# Patient Record
Sex: MALE | Race: WHITE | Employment: FULL TIME | URBAN - METROPOLITAN AREA
[De-identification: names, ages, dates, MRNs, and addresses within clinical notes are randomized per-mention and may not be internally consistent; named-entity substitution may affect disease eponyms.]

---

## 2017-01-10 ENCOUNTER — HOSPITAL ENCOUNTER (OUTPATIENT)
Dept: CV DIAGNOSTICS | Facility: HOSPITAL | Age: 47
Discharge: HOME | End: 2017-01-10
Attending: FAMILY MEDICINE
Payer: COMMERCIAL

## 2017-01-10 DIAGNOSIS — I25.110 ATHEROSCLEROSIS OF NATIVE CORONARY ARTERY OF NATIVE HEART WITH UNSTABLE ANGINA PECTORIS (HCC): ICD-10-CM

## 2017-01-10 PROCEDURE — 93350 STRESS TTE ONLY: CPT

## 2017-01-10 PROCEDURE — 93017 CV STRESS TEST TRACING ONLY: CPT

## 2017-01-10 PROCEDURE — 93225 XTRNL ECG REC<48 HRS REC: CPT

## 2017-01-10 PROCEDURE — 93018 CV STRESS TEST I&R ONLY: CPT | Performed by: INTERNAL MEDICINE

## 2017-01-10 PROCEDURE — 93226 XTRNL ECG REC<48 HR SCAN A/R: CPT

## 2017-01-10 PROCEDURE — 93227 XTRNL ECG REC<48 HR R&I: CPT | Performed by: FAMILY MEDICINE

## 2017-01-10 PROCEDURE — 93350 STRESS TTE ONLY: CPT | Performed by: INTERNAL MEDICINE

## 2021-10-14 ENCOUNTER — OFFICE VISIT (OUTPATIENT)
Dept: SURGERY | Facility: CLINIC | Age: 51
End: 2021-10-14
Payer: COMMERCIAL

## 2021-10-14 ENCOUNTER — TELEPHONE (OUTPATIENT)
Dept: SURGERY | Facility: CLINIC | Age: 51
End: 2021-10-14

## 2021-10-14 VITALS — HEART RATE: 88 BPM | SYSTOLIC BLOOD PRESSURE: 136 MMHG | DIASTOLIC BLOOD PRESSURE: 90 MMHG

## 2021-10-14 DIAGNOSIS — M54.12 CERVICAL RADICULOPATHY: ICD-10-CM

## 2021-10-14 DIAGNOSIS — M47.812 CERVICAL SPONDYLOSIS WITHOUT MYELOPATHY: Primary | ICD-10-CM

## 2021-10-14 PROCEDURE — 99203 OFFICE O/P NEW LOW 30 MIN: CPT | Performed by: NEUROLOGICAL SURGERY

## 2021-10-14 PROCEDURE — 3075F SYST BP GE 130 - 139MM HG: CPT | Performed by: NEUROLOGICAL SURGERY

## 2021-10-14 PROCEDURE — 3080F DIAST BP >= 90 MM HG: CPT | Performed by: NEUROLOGICAL SURGERY

## 2021-10-14 RX ORDER — HYDROCORTISONE 25 MG/G
CREAM TOPICAL
COMMUNITY
Start: 2021-10-06

## 2021-10-14 RX ORDER — IBUPROFEN 200 MG
800 TABLET ORAL 2 TIMES DAILY PRN
COMMUNITY

## 2021-10-14 RX ORDER — LISINOPRIL 30 MG/1
TABLET ORAL
COMMUNITY
Start: 2021-10-06

## 2021-10-14 RX ORDER — MONTELUKAST SODIUM 10 MG/1
TABLET ORAL
COMMUNITY
Start: 2021-10-06

## 2021-10-14 RX ORDER — CLOBETASOL PROPIONATE 0.5 MG/G
CREAM TOPICAL
COMMUNITY
Start: 2021-10-06

## 2021-10-14 RX ORDER — MELOXICAM 15 MG/1
15 TABLET ORAL DAILY
Qty: 30 TABLET | Refills: 2 | Status: SHIPPED | OUTPATIENT
Start: 2021-10-14 | End: 2021-12-02

## 2021-10-14 RX ORDER — POLYETHYLENE GLYCOL-3350 AND ELECTROLYTES WITH FLAVOR PACK 240; 5.84; 2.98; 6.72; 22.72 G/278.26G; G/278.26G; G/278.26G; G/278.26G; G/278.26G
POWDER, FOR SOLUTION ORAL
COMMUNITY
Start: 2021-10-12 | End: 2021-12-02

## 2021-10-14 NOTE — PROGRESS NOTES
Pain at this moment 0/10    Pain in thoracic to into arms worse on the left  Pain in the neck and headaches    New imaging from [facility] uploaded at the

## 2021-10-14 NOTE — H&P
Neurosurgery Clinic Visit  10/14/2021    Lawrence Hernandez PCP:  Sim Avila MD    1970 MRN [id number]       CHIEF COMPLAINT:  Patient presents with:  Neck Pain: NP      HISTORY OF PRESENT ILLNESS:  Lawrence Hernandez is a(n) 46year old (FIBER) 625 MG Oral Tab Take 3 tablets by mouth daily. • omeprazole 20 MG Oral Capsule Delayed Release Take 20 mg by mouth every morning. • Albuterol Sulfate (PROVENTIL HFA IN) Inhale 2 Inhalers into the lungs as needed.      • GAVILYTE-C 240 g Or problem appear intact and appropriate. Cranial nerve Exam:  Visual fields are full. The pupils are equal, round, and reactive to light. The extraocular movements are intact. Facial sensation in intact.   Facial symmetry and movement of the face is intac looks  He will follow-up with me in 6 weeks  We reviewed his films in detail  All questions answered  Patient very appreciative        Time spent on counseling/coordination of care:  30 Minutes    Total time spent with patient:  Jimy Espana

## 2021-11-02 ENCOUNTER — HOSPITAL ENCOUNTER (OUTPATIENT)
Dept: GENERAL RADIOLOGY | Age: 51
End: 2021-11-02
Attending: NEUROLOGICAL SURGERY
Payer: COMMERCIAL

## 2021-11-02 ENCOUNTER — HOSPITAL ENCOUNTER (OUTPATIENT)
Dept: GENERAL RADIOLOGY | Age: 51
Discharge: HOME | End: 2021-11-02
Attending: NEUROLOGICAL SURGERY
Payer: COMMERCIAL

## 2021-11-02 DIAGNOSIS — M54.12 CERVICAL RADICULOPATHY: ICD-10-CM

## 2021-11-02 DIAGNOSIS — M47.812 CERVICAL SPONDYLOSIS WITHOUT MYELOPATHY: ICD-10-CM

## 2021-11-02 PROCEDURE — 72052 X-RAY EXAM NECK SPINE 6/>VWS: CPT | Performed by: NEUROLOGICAL SURGERY

## 2021-12-02 ENCOUNTER — OFFICE VISIT (OUTPATIENT)
Dept: SURGERY | Facility: CLINIC | Age: 51
End: 2021-12-02
Payer: COMMERCIAL

## 2021-12-02 VITALS — HEART RATE: 80 BPM | SYSTOLIC BLOOD PRESSURE: 150 MMHG | DIASTOLIC BLOOD PRESSURE: 90 MMHG

## 2021-12-02 DIAGNOSIS — M47.812 CERVICAL SPONDYLOSIS WITHOUT MYELOPATHY: Primary | ICD-10-CM

## 2021-12-02 PROCEDURE — 3077F SYST BP >= 140 MM HG: CPT | Performed by: NEUROLOGICAL SURGERY

## 2021-12-02 PROCEDURE — 3080F DIAST BP >= 90 MM HG: CPT | Performed by: NEUROLOGICAL SURGERY

## 2021-12-02 PROCEDURE — 99212 OFFICE O/P EST SF 10 MIN: CPT | Performed by: NEUROLOGICAL SURGERY

## 2021-12-02 NOTE — PROGRESS NOTES
Neurosurgery Clinic Visit  2021    Maria Elena Brooke PCP:  Jo Romero MD    1970 MRN [id number]     HISTORY OF PRESENT ILLNESS:  Maria Elena Brooke is a(n) 46year old male presents for follow-up  He has been doing okay  He notes sometimes v

## 2022-02-12 ENCOUNTER — IMMUNIZATION (OUTPATIENT)
Dept: LAB | Age: 52
End: 2022-02-12
Attending: EMERGENCY MEDICINE
Payer: COMMERCIAL

## 2022-02-12 DIAGNOSIS — Z23 NEED FOR VACCINATION: Primary | ICD-10-CM

## 2022-02-12 PROCEDURE — 0054A SARSCOV2 VAC 30MCG TRS SUCR: CPT

## 2022-04-20 ENCOUNTER — APPOINTMENT (OUTPATIENT)
Dept: CT IMAGING | Facility: HOSPITAL | Age: 52
End: 2022-04-20
Attending: EMERGENCY MEDICINE
Payer: COMMERCIAL

## 2022-04-20 ENCOUNTER — APPOINTMENT (OUTPATIENT)
Dept: GENERAL RADIOLOGY | Facility: HOSPITAL | Age: 52
End: 2022-04-20
Attending: EMERGENCY MEDICINE
Payer: COMMERCIAL

## 2022-04-20 ENCOUNTER — HOSPITAL ENCOUNTER (EMERGENCY)
Facility: HOSPITAL | Age: 52
Discharge: HOME | End: 2022-04-20
Attending: EMERGENCY MEDICINE
Payer: COMMERCIAL

## 2022-04-20 VITALS
WEIGHT: 260 LBS | DIASTOLIC BLOOD PRESSURE: 111 MMHG | TEMPERATURE: 97 F | HEART RATE: 68 BPM | HEIGHT: 73 IN | RESPIRATION RATE: 12 BRPM | BODY MASS INDEX: 34.46 KG/M2 | SYSTOLIC BLOOD PRESSURE: 160 MMHG | OXYGEN SATURATION: 97 %

## 2022-04-20 DIAGNOSIS — I10 PRIMARY HYPERTENSION: Primary | ICD-10-CM

## 2022-04-20 DIAGNOSIS — R07.89 CHEST PAIN, ATYPICAL: ICD-10-CM

## 2022-04-20 DIAGNOSIS — G44.89 OTHER HEADACHE SYNDROME: ICD-10-CM

## 2022-04-20 LAB
ALBUMIN SERPL-MCNC: 3.7 G/DL (ref 3.4–5)
ALBUMIN/GLOB SERPL: 1.3 {RATIO} (ref 1–2)
ALP LIVER SERPL-CCNC: 57 U/L
ALT SERPL-CCNC: 80 U/L
ANION GAP SERPL CALC-SCNC: 3 MMOL/L (ref 0–18)
AST SERPL-CCNC: 51 U/L (ref 15–37)
ATRIAL RATE: 65 BPM
BASOPHILS # BLD AUTO: 0.05 X10(3) UL (ref 0–0.2)
BASOPHILS NFR BLD AUTO: 1 %
BILIRUB SERPL-MCNC: 1 MG/DL (ref 0.1–2)
BUN BLD-MCNC: 14 MG/DL (ref 7–18)
CALCIUM BLD-MCNC: 8.8 MG/DL (ref 8.5–10.1)
CHLORIDE SERPL-SCNC: 104 MMOL/L (ref 98–112)
CO2 SERPL-SCNC: 31 MMOL/L (ref 21–32)
CREAT BLD-MCNC: 1.13 MG/DL
EOSINOPHIL # BLD AUTO: 0.09 X10(3) UL (ref 0–0.7)
EOSINOPHIL NFR BLD AUTO: 1.8 %
ERYTHROCYTE [DISTWIDTH] IN BLOOD BY AUTOMATED COUNT: 11.9 %
GLOBULIN PLAS-MCNC: 2.9 G/DL (ref 2.8–4.4)
GLUCOSE BLD-MCNC: 95 MG/DL (ref 70–99)
HCT VFR BLD AUTO: 46.4 %
HGB BLD-MCNC: 16.9 G/DL
IMM GRANULOCYTES # BLD AUTO: 0.02 X10(3) UL (ref 0–1)
IMM GRANULOCYTES NFR BLD: 0.4 %
LYMPHOCYTES # BLD AUTO: 1.07 X10(3) UL (ref 1–4)
LYMPHOCYTES NFR BLD AUTO: 21.6 %
MCH RBC QN AUTO: 31 PG (ref 26–34)
MCHC RBC AUTO-ENTMCNC: 36.4 G/DL (ref 31–37)
MCV RBC AUTO: 85.1 FL
MONOCYTES # BLD AUTO: 0.5 X10(3) UL (ref 0.1–1)
MONOCYTES NFR BLD AUTO: 10.1 %
NEUTROPHILS # BLD AUTO: 3.23 X10 (3) UL (ref 1.5–7.7)
NEUTROPHILS # BLD AUTO: 3.23 X10(3) UL (ref 1.5–7.7)
NEUTROPHILS NFR BLD AUTO: 65.1 %
OSMOLALITY SERPL CALC.SUM OF ELEC: 286 MOSM/KG (ref 275–295)
P AXIS: 43 DEGREES
P-R INTERVAL: 178 MS
PLATELET # BLD AUTO: 172 10(3)UL (ref 150–450)
POTASSIUM SERPL-SCNC: 3.9 MMOL/L (ref 3.5–5.1)
PROT SERPL-MCNC: 6.6 G/DL (ref 6.4–8.2)
Q-T INTERVAL: 410 MS
QRS DURATION: 104 MS
QTC CALCULATION (BEZET): 426 MS
R AXIS: -29 DEGREES
RBC # BLD AUTO: 5.45 X10(6)UL
SARS-COV-2 RNA RESP QL NAA+PROBE: NOT DETECTED
SODIUM SERPL-SCNC: 138 MMOL/L (ref 136–145)
T AXIS: 8 DEGREES
TROPONIN I HIGH SENSITIVITY: 4 NG/L
TROPONIN I HIGH SENSITIVITY: 5 NG/L
VENTRICULAR RATE: 65 BPM
WBC # BLD AUTO: 5 X10(3) UL (ref 4–11)

## 2022-04-20 PROCEDURE — 84484 ASSAY OF TROPONIN QUANT: CPT

## 2022-04-20 PROCEDURE — 80053 COMPREHEN METABOLIC PANEL: CPT

## 2022-04-20 PROCEDURE — 84484 ASSAY OF TROPONIN QUANT: CPT | Performed by: EMERGENCY MEDICINE

## 2022-04-20 PROCEDURE — 70450 CT HEAD/BRAIN W/O DYE: CPT | Performed by: EMERGENCY MEDICINE

## 2022-04-20 PROCEDURE — 99285 EMERGENCY DEPT VISIT HI MDM: CPT

## 2022-04-20 PROCEDURE — 85025 COMPLETE CBC W/AUTO DIFF WBC: CPT | Performed by: EMERGENCY MEDICINE

## 2022-04-20 PROCEDURE — 71045 X-RAY EXAM CHEST 1 VIEW: CPT | Performed by: EMERGENCY MEDICINE

## 2022-04-20 PROCEDURE — 85025 COMPLETE CBC W/AUTO DIFF WBC: CPT

## 2022-04-20 PROCEDURE — 93005 ELECTROCARDIOGRAM TRACING: CPT

## 2022-04-20 PROCEDURE — 96374 THER/PROPH/DIAG INJ IV PUSH: CPT

## 2022-04-20 PROCEDURE — 80053 COMPREHEN METABOLIC PANEL: CPT | Performed by: EMERGENCY MEDICINE

## 2022-04-20 PROCEDURE — 93010 ELECTROCARDIOGRAM REPORT: CPT

## 2022-04-20 RX ORDER — ASPIRIN 81 MG/1
324 TABLET, CHEWABLE ORAL ONCE
Status: COMPLETED | OUTPATIENT
Start: 2022-04-20 | End: 2022-04-20

## 2022-04-20 RX ORDER — NITROGLYCERIN 0.4 MG/1
0.4 TABLET SUBLINGUAL EVERY 5 MIN PRN
Status: DISCONTINUED | OUTPATIENT
Start: 2022-04-20 | End: 2022-04-20

## 2022-04-20 RX ORDER — LISINOPRIL 40 MG/1
40 TABLET ORAL DAILY
Qty: 30 TABLET | Refills: 0 | Status: SHIPPED | OUTPATIENT
Start: 2022-04-20

## 2022-04-20 RX ORDER — HYDRALAZINE HYDROCHLORIDE 20 MG/ML
5 INJECTION INTRAMUSCULAR; INTRAVENOUS ONCE
Status: COMPLETED | OUTPATIENT
Start: 2022-04-20 | End: 2022-04-20

## 2022-04-20 RX ORDER — CLONIDINE HYDROCHLORIDE 0.1 MG/1
0.1 TABLET ORAL ONCE
Status: COMPLETED | OUTPATIENT
Start: 2022-04-20 | End: 2022-04-20

## 2022-04-20 RX ORDER — ACETAMINOPHEN 500 MG
1000 TABLET ORAL ONCE
Status: COMPLETED | OUTPATIENT
Start: 2022-04-20 | End: 2022-04-20

## 2022-04-20 NOTE — CM/SW NOTE
Outpatient stress echo ordered for tomorrow, 4/21/22 @ 8AM.  Instructions explained to patient and spouse. Both verbalized understanding.

## 2022-04-20 NOTE — ED INITIAL ASSESSMENT (HPI)
Pt ambulatory to er with ha all day - took am meds with no relief  Took thc for his chronic neck/med card avail - 2 hrs pta  Cp and left arm pain two hrs ago

## 2022-04-21 ENCOUNTER — HOSPITAL ENCOUNTER (OUTPATIENT)
Dept: CV DIAGNOSTICS | Facility: HOSPITAL | Age: 52
Discharge: HOME | End: 2022-04-21
Attending: EMERGENCY MEDICINE
Payer: COMMERCIAL

## 2022-04-21 DIAGNOSIS — R07.89 CHEST PAIN, ATYPICAL: ICD-10-CM

## 2022-04-21 PROCEDURE — 93017 CV STRESS TEST TRACING ONLY: CPT | Performed by: EMERGENCY MEDICINE

## 2022-04-21 PROCEDURE — 93350 STRESS TTE ONLY: CPT | Performed by: EMERGENCY MEDICINE

## 2022-04-21 PROCEDURE — 93018 CV STRESS TEST I&R ONLY: CPT | Performed by: EMERGENCY MEDICINE

## 2022-04-27 ENCOUNTER — OFFICE VISIT (OUTPATIENT)
Dept: FAMILY MEDICINE CLINIC | Facility: CLINIC | Age: 52
End: 2022-04-27
Payer: COMMERCIAL

## 2022-04-27 VITALS
RESPIRATION RATE: 16 BRPM | HEART RATE: 68 BPM | HEIGHT: 73 IN | TEMPERATURE: 97 F | DIASTOLIC BLOOD PRESSURE: 106 MMHG | SYSTOLIC BLOOD PRESSURE: 152 MMHG | BODY MASS INDEX: 35.25 KG/M2 | WEIGHT: 266 LBS | OXYGEN SATURATION: 98 %

## 2022-04-27 DIAGNOSIS — I10 UNCONTROLLED HYPERTENSION: Primary | ICD-10-CM

## 2022-04-27 PROCEDURE — 3080F DIAST BP >= 90 MM HG: CPT | Performed by: FAMILY MEDICINE

## 2022-04-27 PROCEDURE — 99203 OFFICE O/P NEW LOW 30 MIN: CPT | Performed by: FAMILY MEDICINE

## 2022-04-27 PROCEDURE — 3077F SYST BP >= 140 MM HG: CPT | Performed by: FAMILY MEDICINE

## 2022-04-27 PROCEDURE — 3008F BODY MASS INDEX DOCD: CPT | Performed by: FAMILY MEDICINE

## 2022-04-27 RX ORDER — METOPROLOL SUCCINATE 50 MG/1
TABLET, EXTENDED RELEASE ORAL
COMMUNITY
Start: 2022-04-04

## 2022-05-09 ENCOUNTER — OFFICE VISIT (OUTPATIENT)
Dept: FAMILY MEDICINE CLINIC | Facility: CLINIC | Age: 52
End: 2022-05-09
Payer: COMMERCIAL

## 2022-05-09 VITALS
DIASTOLIC BLOOD PRESSURE: 90 MMHG | TEMPERATURE: 98 F | WEIGHT: 267 LBS | HEIGHT: 73 IN | RESPIRATION RATE: 16 BRPM | OXYGEN SATURATION: 99 % | HEART RATE: 78 BPM | BODY MASS INDEX: 35.39 KG/M2 | SYSTOLIC BLOOD PRESSURE: 144 MMHG

## 2022-05-09 DIAGNOSIS — I10 UNCONTROLLED HYPERTENSION: Primary | ICD-10-CM

## 2022-05-09 DIAGNOSIS — R06.83 SNORING: ICD-10-CM

## 2022-05-09 DIAGNOSIS — Z12.5 SCREENING FOR PROSTATE CANCER: ICD-10-CM

## 2022-05-09 PROCEDURE — 90471 IMMUNIZATION ADMIN: CPT | Performed by: FAMILY MEDICINE

## 2022-05-09 PROCEDURE — 3077F SYST BP >= 140 MM HG: CPT | Performed by: FAMILY MEDICINE

## 2022-05-09 PROCEDURE — 3080F DIAST BP >= 90 MM HG: CPT | Performed by: FAMILY MEDICINE

## 2022-05-09 PROCEDURE — 90715 TDAP VACCINE 7 YRS/> IM: CPT | Performed by: FAMILY MEDICINE

## 2022-05-09 PROCEDURE — 99214 OFFICE O/P EST MOD 30 MIN: CPT | Performed by: FAMILY MEDICINE

## 2022-05-09 PROCEDURE — 3008F BODY MASS INDEX DOCD: CPT | Performed by: FAMILY MEDICINE

## 2022-05-09 RX ORDER — CETIRIZINE HYDROCHLORIDE 10 MG/1
10 TABLET ORAL DAILY
COMMUNITY

## 2022-05-09 RX ORDER — HYDROCHLOROTHIAZIDE 12.5 MG/1
12.5 TABLET ORAL DAILY
Qty: 30 TABLET | Refills: 1 | Status: SHIPPED | OUTPATIENT
Start: 2022-05-09

## 2022-05-09 RX ORDER — CHOLECALCIFEROL (VITAMIN D3) 1250 MCG
CAPSULE ORAL
COMMUNITY

## 2022-05-09 RX ORDER — MELATONIN
1000 DAILY
COMMUNITY

## 2022-06-06 ENCOUNTER — TELEPHONE (OUTPATIENT)
Dept: FAMILY MEDICINE CLINIC | Facility: CLINIC | Age: 52
End: 2022-06-06

## 2022-06-06 RX ORDER — LISINOPRIL 40 MG/1
40 TABLET ORAL DAILY
Qty: 30 TABLET | Refills: 0 | Status: SHIPPED | OUTPATIENT
Start: 2022-06-06

## 2022-06-06 NOTE — TELEPHONE ENCOUNTER
lisinopril 40mg ( prescribed by ED) will run out end of this week, scheduled follow up for  6/20      please send to [facility] [address]

## 2022-06-06 NOTE — TELEPHONE ENCOUNTER
Future Appointments   Date Time Provider [facility] A[facility]   6/14/2022 10:30 AM OSMANY [address]   6/20/2022  1:30 PM Kwame Still MD [facility]     LOV 5/9/2022

## 2022-06-14 ENCOUNTER — HOSPITAL ENCOUNTER (OUTPATIENT)
Dept: ULTRASOUND IMAGING | Age: 52
Discharge: HOME | End: 2022-06-14
Attending: FAMILY MEDICINE
Payer: COMMERCIAL

## 2022-06-14 DIAGNOSIS — I10 UNCONTROLLED HYPERTENSION: ICD-10-CM

## 2022-06-20 ENCOUNTER — OFFICE VISIT (OUTPATIENT)
Dept: FAMILY MEDICINE CLINIC | Facility: CLINIC | Age: 52
End: 2022-06-20
Payer: COMMERCIAL

## 2022-06-20 VITALS
DIASTOLIC BLOOD PRESSURE: 92 MMHG | TEMPERATURE: 97 F | HEART RATE: 76 BPM | RESPIRATION RATE: 18 BRPM | BODY MASS INDEX: 36.18 KG/M2 | SYSTOLIC BLOOD PRESSURE: 138 MMHG | WEIGHT: 273 LBS | HEIGHT: 73 IN | OXYGEN SATURATION: 98 %

## 2022-06-20 DIAGNOSIS — I10 UNCONTROLLED HYPERTENSION: Primary | ICD-10-CM

## 2022-06-20 PROCEDURE — 3075F SYST BP GE 130 - 139MM HG: CPT | Performed by: FAMILY MEDICINE

## 2022-06-20 PROCEDURE — 99214 OFFICE O/P EST MOD 30 MIN: CPT | Performed by: FAMILY MEDICINE

## 2022-06-20 PROCEDURE — 3008F BODY MASS INDEX DOCD: CPT | Performed by: FAMILY MEDICINE

## 2022-06-20 PROCEDURE — 3080F DIAST BP >= 90 MM HG: CPT | Performed by: FAMILY MEDICINE

## 2022-06-20 RX ORDER — METOPROLOL SUCCINATE 50 MG/1
50 TABLET, EXTENDED RELEASE ORAL DAILY
Qty: 90 TABLET | Refills: 2 | Status: SHIPPED | OUTPATIENT
Start: 2022-06-20

## 2022-06-20 RX ORDER — AMLODIPINE BESYLATE AND BENAZEPRIL HYDROCHLORIDE 5; 40 MG/1; MG/1
1 CAPSULE ORAL DAILY
Qty: 30 CAPSULE | Refills: 0 | Status: SHIPPED | OUTPATIENT
Start: 2022-06-20 | End: 2022-06-20

## 2022-06-20 RX ORDER — CHOLECALCIFEROL (VITAMIN D3) 1250 MCG
1 CAPSULE ORAL WEEKLY
Qty: 12 CAPSULE | Refills: 3 | Status: SHIPPED | OUTPATIENT
Start: 2022-06-20

## 2022-06-20 RX ORDER — HYDROCHLOROTHIAZIDE 12.5 MG/1
12.5 TABLET ORAL DAILY
Qty: 30 TABLET | Refills: 1 | Status: SHIPPED | OUTPATIENT
Start: 2022-06-20

## 2022-06-20 RX ORDER — CETIRIZINE HYDROCHLORIDE 10 MG/1
10 TABLET ORAL DAILY
Qty: 90 TABLET | Refills: 1 | Status: SHIPPED | OUTPATIENT
Start: 2022-06-20

## 2022-06-20 RX ORDER — MONTELUKAST SODIUM 10 MG/1
10 TABLET ORAL NIGHTLY
Qty: 90 TABLET | Refills: 2 | Status: SHIPPED | OUTPATIENT
Start: 2022-06-20

## 2022-06-20 RX ORDER — AMLODIPINE BESYLATE AND BENAZEPRIL HYDROCHLORIDE 5; 40 MG/1; MG/1
1 CAPSULE ORAL DAILY
Qty: 90 CAPSULE | Refills: 2 | Status: SHIPPED | OUTPATIENT
Start: 2022-06-20

## 2022-06-20 RX ORDER — MELATONIN
1000 DAILY
Qty: 90 TABLET | Refills: 2 | Status: SHIPPED | OUTPATIENT
Start: 2022-06-20

## 2022-06-20 RX ORDER — AMLODIPINE AND BENAZEPRIL HYDROCHLORIDE 5; 40 MG/1; MG/1
1 CAPSULE ORAL DAILY
Qty: 90 CAPSULE | Refills: 2 | OUTPATIENT
Start: 2022-06-20

## 2022-06-20 RX ORDER — OMEPRAZOLE 20 MG/1
20 CAPSULE, DELAYED RELEASE ORAL EVERY MORNING
Qty: 90 CAPSULE | Refills: 2 | Status: SHIPPED | OUTPATIENT
Start: 2022-06-20

## 2022-06-20 RX ORDER — AMLODIPINE BESYLATE AND BENAZEPRIL HYDROCHLORIDE 5; 40 MG/1; MG/1
1 CAPSULE ORAL DAILY
Qty: 90 CAPSULE | Refills: 2 | Status: SHIPPED | OUTPATIENT
Start: 2022-06-20 | End: 2022-06-20

## 2022-06-20 RX ORDER — LISINOPRIL 40 MG/1
40 TABLET ORAL DAILY
Qty: 90 TABLET | Refills: 2 | Status: SHIPPED | OUTPATIENT
Start: 2022-06-20 | End: 2022-06-20

## 2022-06-24 ENCOUNTER — TELEPHONE (OUTPATIENT)
Dept: FAMILY MEDICINE CLINIC | Facility: CLINIC | Age: 52
End: 2022-06-24

## 2022-06-28 NOTE — TELEPHONE ENCOUNTER
Dilip Bobo. They are asking for clarification on patient's antihypertensives. They are asking if patient is supposed to be taking both lisinopril and Amlodipine-Benazepril. Reviewed and read Dr. Sadiq Roy note stating to discontinue lisinopril and start amlodipine-Benazepril. They will update their records and dispense appropriate medications.

## 2022-07-14 ENCOUNTER — LAB ENCOUNTER (OUTPATIENT)
Dept: LAB | Age: 52
End: 2022-07-14
Attending: FAMILY MEDICINE
Payer: COMMERCIAL

## 2022-07-14 ENCOUNTER — HOSPITAL ENCOUNTER (OUTPATIENT)
Dept: ULTRASOUND IMAGING | Age: 52
Discharge: HOME | End: 2022-07-14
Attending: FAMILY MEDICINE
Payer: COMMERCIAL

## 2022-07-14 DIAGNOSIS — Z12.5 SCREENING FOR PROSTATE CANCER: ICD-10-CM

## 2022-07-14 DIAGNOSIS — Z00.00 ENCOUNTER FOR ANNUAL PHYSICAL EXAM: ICD-10-CM

## 2022-07-14 DIAGNOSIS — R73.01 IFG (IMPAIRED FASTING GLUCOSE): Primary | ICD-10-CM

## 2022-07-14 DIAGNOSIS — I10 UNCONTROLLED HYPERTENSION: ICD-10-CM

## 2022-07-14 DIAGNOSIS — R73.01 IFG (IMPAIRED FASTING GLUCOSE): ICD-10-CM

## 2022-07-14 LAB
ALBUMIN SERPL-MCNC: 3.7 G/DL (ref 3.4–5)
ALBUMIN/GLOB SERPL: 1.3 {RATIO} (ref 1–2)
ALP LIVER SERPL-CCNC: 53 U/L
ALT SERPL-CCNC: 98 U/L
ANION GAP SERPL CALC-SCNC: 5 MMOL/L (ref 0–18)
AST SERPL-CCNC: 63 U/L (ref 15–37)
BASOPHILS # BLD AUTO: 0.04 X10(3) UL (ref 0–0.2)
BASOPHILS NFR BLD AUTO: 0.9 %
BILIRUB SERPL-MCNC: 0.8 MG/DL (ref 0.1–2)
BUN BLD-MCNC: 15 MG/DL (ref 7–18)
CALCIUM BLD-MCNC: 8.9 MG/DL (ref 8.5–10.1)
CHLORIDE SERPL-SCNC: 106 MMOL/L (ref 98–112)
CHOLEST SERPL-MCNC: 145 MG/DL (ref ?–200)
CO2 SERPL-SCNC: 28 MMOL/L (ref 21–32)
COMPLEXED PSA SERPL-MCNC: 1.63 NG/ML (ref ?–4)
CREAT BLD-MCNC: 1.07 MG/DL
EOSINOPHIL # BLD AUTO: 0.11 X10(3) UL (ref 0–0.7)
EOSINOPHIL NFR BLD AUTO: 2.5 %
ERYTHROCYTE [DISTWIDTH] IN BLOOD BY AUTOMATED COUNT: 12.7 %
FASTING PATIENT LIPID ANSWER: YES
FASTING STATUS PATIENT QL REPORTED: YES
GLOBULIN PLAS-MCNC: 2.8 G/DL (ref 2.8–4.4)
GLUCOSE BLD-MCNC: 128 MG/DL (ref 70–99)
HCT VFR BLD AUTO: 46.5 %
HDLC SERPL-MCNC: 40 MG/DL (ref 40–59)
HGB BLD-MCNC: 16 G/DL
IMM GRANULOCYTES # BLD AUTO: 0.01 X10(3) UL (ref 0–1)
IMM GRANULOCYTES NFR BLD: 0.2 %
LDLC SERPL CALC-MCNC: 89 MG/DL (ref ?–100)
LYMPHOCYTES # BLD AUTO: 0.9 X10(3) UL (ref 1–4)
LYMPHOCYTES NFR BLD AUTO: 20.6 %
MCH RBC QN AUTO: 31.4 PG (ref 26–34)
MCHC RBC AUTO-ENTMCNC: 34.4 G/DL (ref 31–37)
MCV RBC AUTO: 91.4 FL
MONOCYTES # BLD AUTO: 0.54 X10(3) UL (ref 0.1–1)
MONOCYTES NFR BLD AUTO: 12.4 %
NEUTROPHILS # BLD AUTO: 2.77 X10 (3) UL (ref 1.5–7.7)
NEUTROPHILS # BLD AUTO: 2.77 X10(3) UL (ref 1.5–7.7)
NEUTROPHILS NFR BLD AUTO: 63.4 %
NONHDLC SERPL-MCNC: 105 MG/DL (ref ?–130)
OSMOLALITY SERPL CALC.SUM OF ELEC: 290 MOSM/KG (ref 275–295)
PLATELET # BLD AUTO: 169 10(3)UL (ref 150–450)
POTASSIUM SERPL-SCNC: 4.3 MMOL/L (ref 3.5–5.1)
PROT SERPL-MCNC: 6.5 G/DL (ref 6.4–8.2)
RBC # BLD AUTO: 5.09 X10(6)UL
SODIUM SERPL-SCNC: 139 MMOL/L (ref 136–145)
TRIGL SERPL-MCNC: 82 MG/DL (ref 30–149)
VLDLC SERPL CALC-MCNC: 13 MG/DL (ref 0–30)
WBC # BLD AUTO: 4.4 X10(3) UL (ref 4–11)

## 2022-07-14 PROCEDURE — 93975 VASCULAR STUDY: CPT | Performed by: FAMILY MEDICINE

## 2022-07-14 PROCEDURE — 85025 COMPLETE CBC W/AUTO DIFF WBC: CPT | Performed by: FAMILY MEDICINE

## 2022-07-14 PROCEDURE — 80053 COMPREHEN METABOLIC PANEL: CPT | Performed by: FAMILY MEDICINE

## 2022-07-14 PROCEDURE — 83036 HEMOGLOBIN GLYCOSYLATED A1C: CPT | Performed by: FAMILY MEDICINE

## 2022-07-14 PROCEDURE — 80061 LIPID PANEL: CPT | Performed by: FAMILY MEDICINE

## 2022-07-14 PROCEDURE — 84153 ASSAY OF PSA TOTAL: CPT | Performed by: FAMILY MEDICINE

## 2022-07-14 PROCEDURE — 76775 US EXAM ABDO BACK WALL LIM: CPT | Performed by: FAMILY MEDICINE

## 2022-07-15 LAB
EST. AVERAGE GLUCOSE BLD GHB EST-MCNC: 137 MG/DL (ref 68–126)
HBA1C MFR BLD: 6.4 % (ref ?–5.7)

## 2022-07-15 NOTE — PROGRESS NOTES
Please notify the patient of normal  Results except for elevated blood sugars and liver enzymes AST and ALT  Added on hemoglobin A1c to your blood panelRegarding the elevated liver enzymes, stop consumption of Tylenol and alcoholWe will recheck again in 3 months and if elevated, may need further investigation

## 2022-07-16 NOTE — PROGRESS NOTES
Hemoglobin A1c is 6.4, its worse than December 2021We will discuss your results at the time of your follow-up next week

## 2022-07-18 ENCOUNTER — OFFICE VISIT (OUTPATIENT)
Dept: FAMILY MEDICINE CLINIC | Facility: CLINIC | Age: 52
End: 2022-07-18
Payer: COMMERCIAL

## 2022-07-18 VITALS
RESPIRATION RATE: 18 BRPM | DIASTOLIC BLOOD PRESSURE: 78 MMHG | BODY MASS INDEX: 35.92 KG/M2 | WEIGHT: 271 LBS | SYSTOLIC BLOOD PRESSURE: 128 MMHG | TEMPERATURE: 97 F | OXYGEN SATURATION: 100 % | HEIGHT: 73 IN | HEART RATE: 73 BPM

## 2022-07-18 DIAGNOSIS — R63.5 ABNORMAL WEIGHT GAIN: ICD-10-CM

## 2022-07-18 DIAGNOSIS — I10 UNCONTROLLED HYPERTENSION: ICD-10-CM

## 2022-07-18 DIAGNOSIS — Z00.00 ENCOUNTER FOR ANNUAL PHYSICAL EXAM: Primary | ICD-10-CM

## 2022-07-18 DIAGNOSIS — R74.8 ELEVATED LIVER ENZYMES: ICD-10-CM

## 2022-07-18 DIAGNOSIS — R73.03 PREDIABETES: ICD-10-CM

## 2022-07-18 LAB
T4 FREE SERPL-MCNC: 1.1 NG/DL (ref 0.8–1.7)
TSI SER-ACNC: 1.53 MIU/ML (ref 0.36–3.74)

## 2022-07-18 PROCEDURE — 3008F BODY MASS INDEX DOCD: CPT | Performed by: FAMILY MEDICINE

## 2022-07-18 PROCEDURE — 99396 PREV VISIT EST AGE 40-64: CPT | Performed by: FAMILY MEDICINE

## 2022-07-18 PROCEDURE — 3074F SYST BP LT 130 MM HG: CPT | Performed by: FAMILY MEDICINE

## 2022-07-18 PROCEDURE — 99213 OFFICE O/P EST LOW 20 MIN: CPT | Performed by: FAMILY MEDICINE

## 2022-07-18 PROCEDURE — 3078F DIAST BP <80 MM HG: CPT | Performed by: FAMILY MEDICINE

## 2022-08-08 RX ORDER — HYDROCHLOROTHIAZIDE 12.5 MG/1
TABLET ORAL
Qty: 90 TABLET | Refills: 0 | Status: SHIPPED | OUTPATIENT
Start: 2022-08-08

## 2022-11-08 RX ORDER — HYDROCHLOROTHIAZIDE 12.5 MG/1
TABLET ORAL
Qty: 90 TABLET | Refills: 1 | Status: SHIPPED | OUTPATIENT
Start: 2022-11-08

## 2022-11-08 NOTE — TELEPHONE ENCOUNTER
Hypertension Medications Protocol Passed 11/08/2022 04:31 AM   Protocol Details  CMP or BMP in past 12 months    Last serum creatinine< 2.0    Appointment in past 6 or next 3 months        LOV 7/18/22     LAST LAB  7/14/22     LAST RX 8/8/22 90     Next OV  No future appointments.     PROTOCOL pass

## 2023-03-08 RX ORDER — AMLODIPINE BESYLATE AND BENAZEPRIL HYDROCHLORIDE 5; 40 MG/1; MG/1
1 CAPSULE ORAL DAILY
Qty: 90 CAPSULE | Refills: 1 | Status: SHIPPED
Start: 2023-03-08

## 2023-03-08 RX ORDER — MELATONIN
Qty: 90 TABLET | Refills: 1 | Status: SHIPPED | OUTPATIENT
Start: 2023-03-08

## 2023-03-08 RX ORDER — OMEPRAZOLE 20 MG/1
CAPSULE, DELAYED RELEASE ORAL
Qty: 90 CAPSULE | Refills: 1 | Status: SHIPPED
Start: 2023-03-08

## 2023-03-21 ENCOUNTER — LAB ENCOUNTER (OUTPATIENT)
Dept: LAB | Age: 53
End: 2023-03-21
Attending: FAMILY MEDICINE
Payer: COMMERCIAL

## 2023-03-21 DIAGNOSIS — R74.8 ELEVATED LIVER ENZYMES: ICD-10-CM

## 2023-03-21 DIAGNOSIS — R73.03 PREDIABETES: ICD-10-CM

## 2023-03-21 LAB
ALBUMIN SERPL-MCNC: 3.4 G/DL (ref 3.4–5)
ALBUMIN/GLOB SERPL: 1.2 {RATIO} (ref 1–2)
ALP LIVER SERPL-CCNC: 51 U/L
ALT SERPL-CCNC: 90 U/L
ANION GAP SERPL CALC-SCNC: 6 MMOL/L (ref 0–18)
AST SERPL-CCNC: 62 U/L (ref 15–37)
BILIRUB SERPL-MCNC: 0.9 MG/DL (ref 0.1–2)
BUN BLD-MCNC: 13 MG/DL (ref 7–18)
CALCIUM BLD-MCNC: 8.8 MG/DL (ref 8.5–10.1)
CHLORIDE SERPL-SCNC: 100 MMOL/L (ref 98–112)
CO2 SERPL-SCNC: 30 MMOL/L (ref 21–32)
CREAT BLD-MCNC: 1.14 MG/DL
EST. AVERAGE GLUCOSE BLD GHB EST-MCNC: 143 MG/DL (ref 68–126)
FASTING STATUS PATIENT QL REPORTED: YES
GFR SERPLBLD BASED ON 1.73 SQ M-ARVRAT: 77 ML/MIN/1.73M2 (ref 60–?)
GLOBULIN PLAS-MCNC: 2.8 G/DL (ref 2.8–4.4)
GLUCOSE BLD-MCNC: 149 MG/DL (ref 70–99)
HBA1C MFR BLD: 6.6 % (ref ?–5.7)
OSMOLALITY SERPL CALC.SUM OF ELEC: 285 MOSM/KG (ref 275–295)
POTASSIUM SERPL-SCNC: 4.1 MMOL/L (ref 3.5–5.1)
PROT SERPL-MCNC: 6.2 G/DL (ref 6.4–8.2)
SODIUM SERPL-SCNC: 136 MMOL/L (ref 136–145)

## 2023-03-21 PROCEDURE — 83036 HEMOGLOBIN GLYCOSYLATED A1C: CPT | Performed by: FAMILY MEDICINE

## 2023-03-21 PROCEDURE — 80053 COMPREHEN METABOLIC PANEL: CPT | Performed by: FAMILY MEDICINE

## 2023-03-23 ENCOUNTER — OFFICE VISIT (OUTPATIENT)
Dept: FAMILY MEDICINE CLINIC | Facility: CLINIC | Age: 53
End: 2023-03-23
Payer: COMMERCIAL

## 2023-03-23 VITALS
DIASTOLIC BLOOD PRESSURE: 60 MMHG | WEIGHT: 273 LBS | TEMPERATURE: 98 F | BODY MASS INDEX: 36.18 KG/M2 | HEIGHT: 73 IN | HEART RATE: 73 BPM | OXYGEN SATURATION: 97 % | RESPIRATION RATE: 18 BRPM | SYSTOLIC BLOOD PRESSURE: 126 MMHG

## 2023-03-23 DIAGNOSIS — R74.8 ELEVATED LIVER ENZYMES: Primary | ICD-10-CM

## 2023-03-23 DIAGNOSIS — R73.03 PREDIABETES: ICD-10-CM

## 2023-03-23 DIAGNOSIS — I10 ESSENTIAL HYPERTENSION: ICD-10-CM

## 2023-03-23 PROCEDURE — 99214 OFFICE O/P EST MOD 30 MIN: CPT | Performed by: FAMILY MEDICINE

## 2023-03-23 PROCEDURE — 3008F BODY MASS INDEX DOCD: CPT | Performed by: FAMILY MEDICINE

## 2023-03-23 PROCEDURE — 3074F SYST BP LT 130 MM HG: CPT | Performed by: FAMILY MEDICINE

## 2023-03-23 PROCEDURE — 3078F DIAST BP <80 MM HG: CPT | Performed by: FAMILY MEDICINE

## 2023-03-23 RX ORDER — MONTELUKAST SODIUM 10 MG/1
10 TABLET ORAL NIGHTLY
Qty: 90 TABLET | Refills: 2 | Status: SHIPPED | OUTPATIENT
Start: 2023-03-23

## 2023-03-23 RX ORDER — METOPROLOL SUCCINATE 50 MG/1
50 TABLET, EXTENDED RELEASE ORAL DAILY
Qty: 90 TABLET | Refills: 2 | Status: SHIPPED | OUTPATIENT
Start: 2023-03-23

## 2023-04-17 ENCOUNTER — OFFICE VISIT (OUTPATIENT)
Dept: SLEEP CENTER | Age: 53
End: 2023-04-17
Attending: INTERNAL MEDICINE
Payer: COMMERCIAL

## 2023-04-17 DIAGNOSIS — R06.83 SNORING: ICD-10-CM

## 2023-04-17 DIAGNOSIS — I10 UNCONTROLLED HYPERTENSION: ICD-10-CM

## 2023-04-17 PROCEDURE — 95806 SLEEP STUDY UNATT&RESP EFFT: CPT

## 2023-05-08 RX ORDER — HYDROCHLOROTHIAZIDE 12.5 MG/1
TABLET ORAL
Qty: 90 TABLET | Refills: 0 | Status: SHIPPED | OUTPATIENT
Start: 2023-05-08

## 2023-05-08 NOTE — TELEPHONE ENCOUNTER
LOV 3/23/2023      LAST LAB 3/21/23    LAST RX  HYDROCHLOROTHIAZIDE 12.5 MG Oral Tab 90 tablet 1 11/8/2022         Next OV No future appointments.       PROTOCOL pass

## 2023-07-06 NOTE — TELEPHONE ENCOUNTER
LOV 3/23/2023      LAST LAB 3/21/23    LAST RX  METFORMIN 500 MG Oral Tab 90 tablet 2 3/8/2023       Next OV No future appointments.       PROTOCOL failed

## 2023-08-07 RX ORDER — HYDROCHLOROTHIAZIDE 12.5 MG/1
TABLET ORAL
Qty: 90 TABLET | Refills: 0 | Status: SHIPPED | OUTPATIENT
Start: 2023-08-07

## 2023-08-07 NOTE — TELEPHONE ENCOUNTER
Hypertension Medications Protocol Osiezk5008/05/2023 04:47 AM   Protocol Details CMP or BMP in past 12 months    Last serum creatinine< 2.0    Appointment in past 6 or next 3 months          LOV 3/23/23     LAST LAB  3/21/23     LAST RX  5/8/23 90     Next OV No future appointments.       PROTOCOL  pass

## 2023-09-05 RX ORDER — AMLODIPINE AND BENAZEPRIL HYDROCHLORIDE 5; 40 MG/1; MG/1
1 CAPSULE ORAL DAILY
Qty: 90 CAPSULE | Refills: 0 | Status: SHIPPED | OUTPATIENT
Start: 2023-09-05

## 2023-09-05 RX ORDER — OMEPRAZOLE 20 MG/1
CAPSULE, DELAYED RELEASE ORAL
Qty: 90 CAPSULE | Refills: 0 | Status: SHIPPED | OUTPATIENT
Start: 2023-09-05

## 2023-09-05 RX ORDER — MELATONIN
1000 DAILY
Qty: 90 TABLET | Refills: 0 | Status: SHIPPED | OUTPATIENT
Start: 2023-09-05

## 2023-09-05 NOTE — TELEPHONE ENCOUNTER
LOV 3/23/2023      LAST LAB 3/21/23    LAST RX 3/8/23 90 tab 1 refill    Next OV No future appointments.       PROTOCOL pass

## 2023-10-03 NOTE — TELEPHONE ENCOUNTER
Appt scheduled for     Future Appointments   Date Time Provider Kristal Jeffers   10/11/2023 12:00 PM Mia Ash MD [facility]

## 2023-10-03 NOTE — TELEPHONE ENCOUNTER
LOV 3/23/2023      LAST LAB 3/21/23    LAST RX   metFORMIN 500 MG Oral Tab 90 tablet 1 7/6/2023    Sig:   Take 1 tablet (500 mg total) by mouth 2 (two) times daily with meals. Next OV No future appointments.       PROTOCOL failed

## 2023-10-18 ENCOUNTER — OFFICE VISIT (OUTPATIENT)
Dept: FAMILY MEDICINE CLINIC | Facility: CLINIC | Age: 53
End: 2023-10-18
Payer: COMMERCIAL

## 2023-10-18 VITALS
HEIGHT: 73 IN | HEART RATE: 87 BPM | TEMPERATURE: 97 F | DIASTOLIC BLOOD PRESSURE: 78 MMHG | BODY MASS INDEX: 34.72 KG/M2 | OXYGEN SATURATION: 98 % | WEIGHT: 262 LBS | RESPIRATION RATE: 18 BRPM | SYSTOLIC BLOOD PRESSURE: 128 MMHG

## 2023-10-18 DIAGNOSIS — Z23 NEED FOR VACCINATION: ICD-10-CM

## 2023-10-18 DIAGNOSIS — I10 ESSENTIAL HYPERTENSION: ICD-10-CM

## 2023-10-18 DIAGNOSIS — A63.0 CONDYLOMA ACUMINATUM: ICD-10-CM

## 2023-10-18 DIAGNOSIS — Z00.00 ENCOUNTER FOR ANNUAL PHYSICAL EXAM: Primary | ICD-10-CM

## 2023-10-18 DIAGNOSIS — R73.03 PREDIABETES: ICD-10-CM

## 2023-10-18 DIAGNOSIS — R74.8 ELEVATED LIVER ENZYMES: ICD-10-CM

## 2023-10-18 PROCEDURE — 3008F BODY MASS INDEX DOCD: CPT | Performed by: FAMILY MEDICINE

## 2023-10-18 PROCEDURE — 3078F DIAST BP <80 MM HG: CPT | Performed by: FAMILY MEDICINE

## 2023-10-18 PROCEDURE — 99396 PREV VISIT EST AGE 40-64: CPT | Performed by: FAMILY MEDICINE

## 2023-10-18 PROCEDURE — 3074F SYST BP LT 130 MM HG: CPT | Performed by: FAMILY MEDICINE

## 2023-10-18 PROCEDURE — 90750 HZV VACC RECOMBINANT IM: CPT | Performed by: FAMILY MEDICINE

## 2023-10-18 PROCEDURE — 90686 IIV4 VACC NO PRSV 0.5 ML IM: CPT | Performed by: FAMILY MEDICINE

## 2023-10-18 PROCEDURE — 99214 OFFICE O/P EST MOD 30 MIN: CPT | Performed by: FAMILY MEDICINE

## 2023-10-18 PROCEDURE — 90472 IMMUNIZATION ADMIN EACH ADD: CPT | Performed by: FAMILY MEDICINE

## 2023-10-18 PROCEDURE — 90471 IMMUNIZATION ADMIN: CPT | Performed by: FAMILY MEDICINE

## 2023-10-20 ENCOUNTER — TELEPHONE (OUTPATIENT)
Dept: FAMILY MEDICINE CLINIC | Facility: CLINIC | Age: 53
End: 2023-10-20

## 2023-10-20 NOTE — TELEPHONE ENCOUNTER
Pt said his Rx for Metformin went to [facility] but should have went to pill pack by [employer] on file

## 2023-11-03 RX ORDER — HYDROCHLOROTHIAZIDE 12.5 MG/1
TABLET ORAL
Qty: 90 TABLET | Refills: 1 | Status: SHIPPED | OUTPATIENT
Start: 2023-11-03

## 2023-11-03 NOTE — TELEPHONE ENCOUNTER
Hypertension Medications Protocol Svclqr3211/03/2023 05:04 AM   Protocol Details CMP or BMP in past 12 months    Last serum creatinine< 2.0    Appointment in past 6 or next 3 months        LOV 10/18/23 dr Jus Monk    LAST LAB  3/21/23    LAST RX 8/7/23 90     Next OV No future appointments.       PROTOCOL pass

## 2023-12-04 RX ORDER — MELATONIN
1000 DAILY
Qty: 90 TABLET | Refills: 0 | Status: SHIPPED | OUTPATIENT
Start: 2023-12-04

## 2023-12-04 RX ORDER — AMLODIPINE AND BENAZEPRIL HYDROCHLORIDE 5; 40 MG/1; MG/1
1 CAPSULE ORAL DAILY
Qty: 90 CAPSULE | Refills: 0 | Status: SHIPPED | OUTPATIENT
Start: 2023-12-04

## 2023-12-04 RX ORDER — OMEPRAZOLE 20 MG/1
CAPSULE, DELAYED RELEASE ORAL
Qty: 90 CAPSULE | Refills: 0 | Status: SHIPPED | OUTPATIENT
Start: 2023-12-04

## 2023-12-04 NOTE — TELEPHONE ENCOUNTER
LOV 10/18/2023      LAST LAB 3/21/23    LAST RX   Medication Quantity Refills Start End   AMLODIPINE BESY-BENAZEPRIL HCL 5-40 MG Oral Cap 90 capsule 0 9/5/2023        Next OV No future appointments.       PROTOCOL pass

## 2024-02-06 ENCOUNTER — IMMUNIZATION (OUTPATIENT)
Dept: LAB | Age: 54
End: 2024-02-06
Attending: EMERGENCY MEDICINE
Payer: COMMERCIAL

## 2024-02-06 DIAGNOSIS — Z23 NEED FOR VACCINATION: Primary | ICD-10-CM

## 2024-02-06 PROCEDURE — 90480 ADMN SARSCOV2 VAC 1/ONLY CMP: CPT

## 2024-02-27 ENCOUNTER — PATIENT MESSAGE (OUTPATIENT)
Dept: FAMILY MEDICINE CLINIC | Facility: CLINIC | Age: 54
End: 2024-02-27

## 2024-02-27 ENCOUNTER — LAB ENCOUNTER (OUTPATIENT)
Dept: LAB | Age: 54
End: 2024-02-27
Attending: FAMILY MEDICINE
Payer: COMMERCIAL

## 2024-02-27 ENCOUNTER — TELEPHONE (OUTPATIENT)
Dept: FAMILY MEDICINE CLINIC | Facility: CLINIC | Age: 54
End: 2024-02-27

## 2024-02-27 ENCOUNTER — OFFICE VISIT (OUTPATIENT)
Dept: FAMILY MEDICINE CLINIC | Facility: CLINIC | Age: 54
End: 2024-02-27
Payer: COMMERCIAL

## 2024-02-27 VITALS
HEART RATE: 74 BPM | WEIGHT: 267 LBS | OXYGEN SATURATION: 98 % | TEMPERATURE: 97 F | BODY MASS INDEX: 35.39 KG/M2 | HEIGHT: 73 IN | RESPIRATION RATE: 18 BRPM | DIASTOLIC BLOOD PRESSURE: 64 MMHG | SYSTOLIC BLOOD PRESSURE: 118 MMHG

## 2024-02-27 DIAGNOSIS — R10.11 RIGHT UPPER QUADRANT ABDOMINAL PAIN: ICD-10-CM

## 2024-02-27 DIAGNOSIS — R73.03 PREDIABETES: ICD-10-CM

## 2024-02-27 DIAGNOSIS — R10.11 RIGHT UPPER QUADRANT ABDOMINAL PAIN: Primary | ICD-10-CM

## 2024-02-27 DIAGNOSIS — R74.8 ELEVATED LIVER ENZYMES: ICD-10-CM

## 2024-02-27 DIAGNOSIS — Z00.00 ENCOUNTER FOR ANNUAL PHYSICAL EXAM: ICD-10-CM

## 2024-02-27 LAB
ALBUMIN SERPL-MCNC: 3.4 G/DL (ref 3.4–5)
ALBUMIN/GLOB SERPL: 1 {RATIO} (ref 1–2)
ALP LIVER SERPL-CCNC: 100 U/L
ALT SERPL-CCNC: 95 U/L
AMYLASE SERPL-CCNC: 68 U/L (ref 25–115)
ANION GAP SERPL CALC-SCNC: 8 MMOL/L (ref 0–18)
AST SERPL-CCNC: 88 U/L (ref 15–37)
BASOPHILS # BLD AUTO: 0.06 X10(3) UL (ref 0–0.2)
BASOPHILS NFR BLD AUTO: 1.2 %
BILIRUB SERPL-MCNC: 1.3 MG/DL (ref 0.1–2)
BUN BLD-MCNC: 10 MG/DL (ref 9–23)
CALCIUM BLD-MCNC: 9.5 MG/DL (ref 8.5–10.1)
CHLORIDE SERPL-SCNC: 100 MMOL/L (ref 98–112)
CHOLEST SERPL-MCNC: 167 MG/DL (ref ?–200)
CO2 SERPL-SCNC: 24 MMOL/L (ref 21–32)
CREAT BLD-MCNC: 1.08 MG/DL
EGFRCR SERPLBLD CKD-EPI 2021: 82 ML/MIN/1.73M2 (ref 60–?)
EOSINOPHIL # BLD AUTO: 0.22 X10(3) UL (ref 0–0.7)
EOSINOPHIL NFR BLD AUTO: 4.3 %
ERYTHROCYTE [DISTWIDTH] IN BLOOD BY AUTOMATED COUNT: 12.3 %
EST. AVERAGE GLUCOSE BLD GHB EST-MCNC: 169 MG/DL (ref 68–126)
FASTING PATIENT LIPID ANSWER: YES
FASTING STATUS PATIENT QL REPORTED: YES
GLOBULIN PLAS-MCNC: 3.4 G/DL (ref 2.8–4.4)
GLUCOSE BLD-MCNC: 147 MG/DL (ref 70–99)
HBA1C MFR BLD: 7.5 % (ref ?–5.7)
HCT VFR BLD AUTO: 47.4 %
HDLC SERPL-MCNC: 42 MG/DL (ref 40–59)
HGB BLD-MCNC: 16.9 G/DL
IMM GRANULOCYTES # BLD AUTO: 0.02 X10(3) UL (ref 0–1)
IMM GRANULOCYTES NFR BLD: 0.4 %
LDLC SERPL CALC-MCNC: 107 MG/DL (ref ?–100)
LIPASE SERPL-CCNC: 89 U/L (ref ?–300)
LYMPHOCYTES # BLD AUTO: 0.86 X10(3) UL (ref 1–4)
LYMPHOCYTES NFR BLD AUTO: 17 %
MCH RBC QN AUTO: 30.6 PG (ref 26–34)
MCHC RBC AUTO-ENTMCNC: 35.7 G/DL (ref 31–37)
MCV RBC AUTO: 85.7 FL
MONOCYTES # BLD AUTO: 0.66 X10(3) UL (ref 0.1–1)
MONOCYTES NFR BLD AUTO: 13 %
NEUTROPHILS # BLD AUTO: 3.24 X10 (3) UL (ref 1.5–7.7)
NEUTROPHILS # BLD AUTO: 3.24 X10(3) UL (ref 1.5–7.7)
NEUTROPHILS NFR BLD AUTO: 64.1 %
NONHDLC SERPL-MCNC: 125 MG/DL (ref ?–130)
OSMOLALITY SERPL CALC.SUM OF ELEC: 276 MOSM/KG (ref 275–295)
PLATELET # BLD AUTO: 222 10(3)UL (ref 150–450)
POTASSIUM SERPL-SCNC: 3.8 MMOL/L (ref 3.5–5.1)
PROT SERPL-MCNC: 6.8 G/DL (ref 6.4–8.2)
RBC # BLD AUTO: 5.53 X10(6)UL
SODIUM SERPL-SCNC: 132 MMOL/L (ref 136–145)
T4 FREE SERPL-MCNC: 1.2 NG/DL (ref 0.8–1.7)
TRIGL SERPL-MCNC: 98 MG/DL (ref 30–149)
TSI SER-ACNC: 2.2 MIU/ML (ref 0.36–3.74)
VLDLC SERPL CALC-MCNC: 17 MG/DL (ref 0–30)
WBC # BLD AUTO: 5.1 X10(3) UL (ref 4–11)

## 2024-02-27 PROCEDURE — 84439 ASSAY OF FREE THYROXINE: CPT

## 2024-02-27 PROCEDURE — 99214 OFFICE O/P EST MOD 30 MIN: CPT | Performed by: FAMILY MEDICINE

## 2024-02-27 PROCEDURE — 80061 LIPID PANEL: CPT

## 2024-02-27 PROCEDURE — 80053 COMPREHEN METABOLIC PANEL: CPT

## 2024-02-27 PROCEDURE — 36415 COLL VENOUS BLD VENIPUNCTURE: CPT

## 2024-02-27 PROCEDURE — 85025 COMPLETE CBC W/AUTO DIFF WBC: CPT

## 2024-02-27 PROCEDURE — 83690 ASSAY OF LIPASE: CPT

## 2024-02-27 PROCEDURE — 82150 ASSAY OF AMYLASE: CPT

## 2024-02-27 PROCEDURE — 84443 ASSAY THYROID STIM HORMONE: CPT

## 2024-02-27 PROCEDURE — 83013 H PYLORI (C-13) BREATH: CPT

## 2024-02-27 PROCEDURE — 83036 HEMOGLOBIN GLYCOSYLATED A1C: CPT

## 2024-02-27 NOTE — TELEPHONE ENCOUNTER
Pt calling stating that  told him to call if pt could not get in ASAP for US. The earliest he could get is next Tuesday. Calling to see if we can get him in sooner. Please advise    Future Appointments   Date Time Provider Department Center   3/5/2024  8:30 AM [facility]

## 2024-02-27 NOTE — TELEPHONE ENCOUNTER
[facility] called, spoke with Katie, stating PCP ordered CLOtest and this will not be able to be done. Will need new order for H.Pylori test- informed orders \"only to [facility]\".   Per lab to enter '[id number]'- order still shows as \"[facility]\".   When signed, order appears to Edward- per lab will be able to process.   Nothing further required. Lab verbalized understanding and agreed with POC.

## 2024-02-27 NOTE — PROGRESS NOTES
/64   Pulse 74   Temp 97.3 °F (36.3 °C) (Temporal)   Resp 18   Ht 6' 1\" (1.854 m)   Wt 267 lb (121.1 kg)   SpO2 98%   BMI 35.23 kg/m²               Chief Complaint   Patient presents with    Abdominal Pain        HPI;    Lokesh Joyner is a 53 year old male.  With history of type 2 diabetes mellitus hypertension hyperlipidemia fatty liver and obesity comes here today with history of right upper quadrant abdominal pain that has been going on on and off for the last 2 weeks, he did some diet modification with good relief, unfortunately the pain came back this weekend located right upper quadrant started after he had his breakfast, there was an omelette with cheese, he did not feel nauseous but had no appetite, felt bloated  Denies any history of diarrhea but states that his stools are not normal and sometimes they are green  Denies any blood in the stool, or black stools  No history of fever  Patient states that he has stopped taking omeprazole about month ago but restarted it 2 days ago    Patient had his blood test done that was ordered in October, the results are pending      Wt Readings from Last 3 Encounters:   02/27/24 267 lb (121.1 kg)   10/18/23 262 lb (118.8 kg)   03/23/23 273 lb (123.8 kg)     BP Readings from Last 3 Encounters:   02/27/24 118/64   10/18/23 128/78   03/23/23 126/60         ALLERGIES:  No Known Allergies  Current Outpatient Medications   Medication Sig Dispense Refill    AMLODIPINE BESY-BENAZEPRIL HCL 5-40 MG Oral Cap Take 1 capsule by mouth daily. 90 capsule 0    OMEPRAZOLE 20 MG Oral Capsule Delayed Release Take 1 capsule by mouth every morning. 90 capsule 0    cyanocobalamin 1000 MCG Oral Tab Take 1 tablet (1,000 mcg total) by mouth daily. 90 tablet 0    hydroCHLOROthiazide 12.5 MG Oral Tab Take 1 tablet by mouth once daily. 90 tablet 1    metFORMIN 500 MG Oral Tab Take 1 tablet (500 mg total) by mouth 2 (two) times daily with meals. 180 tablet 1    montelukast 10 MG Oral  Tab Take 1 tablet (10 mg total) by mouth nightly. 90 tablet 2    metoprolol succinate ER 50 MG Oral Tablet 24 Hr Take 1 tablet (50 mg total) by mouth daily. 90 tablet 2    Cholecalciferol (VITAMIN D3) 1.25 MG (85526 UT) Oral Cap Take 1 capsule by mouth once a week. 12 capsule 3    cetirizine 10 MG Oral Tab Take 1 tablet (10 mg total) by mouth daily. 90 tablet 1    clobetasol 0.05 % External Cream       ibuprofen 200 MG Oral Tab Take 4 tablets (800 mg total) by mouth 2 (two) times daily as needed for Pain.      Calcium Polycarbophil (FIBER) 625 MG Oral Tab Take 2 tablets (1,250 mg total) by mouth daily.      Albuterol Sulfate (PROVENTIL HFA IN) Inhale 2 Inhalers into the lungs as needed.        Past Medical History:   Diagnosis Date    Condyloma     Diabetes (HCC)     Esophageal reflux     Essential hypertension     Extrinsic asthma, unspecified     / in good control    Spinal stenosis       Social History:  Social History     Socioeconomic History    Marital status:    Tobacco Use    Smoking status: Former    Smokeless tobacco: Never    Tobacco comments:     smoked in college   Substance and Sexual Activity    Alcohol use: Yes     Comment: 2-3 x week    Drug use: Yes     Types: Cannabis     Comment: medical use-has card        REVIEW OF SYSTEMS:   GENERAL HEALTH: feels well otherwise  SKIN: denies any unusual skin lesions or rashes  RESPIRATORY: denies shortness of breath with exertion  CARDIOVASCULAR: denies chest pain on exertion  GI: As per HPI  NEURO: denies headaches  EXAM:   /64   Pulse 74   Temp 97.3 °F (36.3 °C) (Temporal)   Resp 18   Ht 6' 1\" (1.854 m)   Wt 267 lb (121.1 kg)   SpO2 98%   BMI 35.23 kg/m²   GENERAL: well developed, well nourished,in no apparent distress  SKIN: no rashes,no suspicious lesions  HEENT: atraumatic, normocephalic,ears and throat are clear  NECK: supple,no adenopathy,no bruits  LUNGS: clear to auscultation  CARDIO: RRR without murmur  GI: good BS's,no masses,  HSM or tenderness, no upper quadrant tenderness, no rebound, negative Zhao sign  Tympanic to percussion in the epigastric as well as left lower quadrant  EXTREMITIES: no cyanosis, clubbing or edema    ASSESSMENT AND PLAN:   Diagnoses and all orders for this visit:    Right upper quadrant abdominal pain  Lengthy discussion with the patient, we discussed the differential diagnosis of the right upper quadrant discomfort and pain including gallbladder disease pancreatitis, peptic ulcer and H. pylori gastritis-  My plan at this time is to check his amylase and lipase  For H. pylori breath test  Will also get ultrasound of the abdomen  In the meantime patient is advised to stay away from spicy and fatty food  If the abdominal pain gets worse he should to the emergency room  Ultrasound of the abdomen ordered to look at the gallbladder  We will call him with the results   lipase [E]; Future  -     Amylase; Future  -     CLOTest [E]; Future  -     US ABDOMEN COMPLETE (CPT=76700); Future        The patient indicates understanding of these issues and agrees to the plan.  Imaging & Consults:  US ABDOMEN COMPLETE (CPT=76700)  Meds & Refills for this Visit:  Requested Prescriptions      No prescriptions requested or ordered in this encounter     Orders Placed This Encounter   Procedures    Lipase [E]    Amylase    CLOTest [E]             Aaron Pompa MD   [facility]  [address] [place] [id number]    Electronically signed    This dictation was performed with a verbal recognition program (DRAGON) and it was checked for errors. It is possible that there are still dictated errors within this office note. If so, please bring any errors to my attention for an addendum. All efforts were made to ensure that this office note is accurate

## 2024-02-28 LAB — H PYLORI BREATH TEST: NEGATIVE

## 2024-02-28 NOTE — TELEPHONE ENCOUNTER
Returned call to patient and gave him numbers for [facility] and [facility] to see if they can schedule US sooner.  Advised to let us know if he can get a sooner appt and we will fax the order as appropriate.  Reviewed lab result note from Dr. Pompa.  He will discuss further at F/U appt after US is done.  Verbalizes understanding.    Aaron Pompa MD  2/28/2024 10:39 AM CST       Your electrolytes are normal except for a sodium of 132  Lipid  profile is normal  Unfortunately your A1c got worse at 7.5, we may need to go up on the metformin.  We will discuss further at the time of his follow-up after the ultrasound  Your amylase and lipase are normal

## 2024-02-28 NOTE — PROGRESS NOTES
Your electrolytes are normal except for a sodium of 132  Lipid  profile is normal  Unfortunately your A1c got worse at 7.5, we may need to go up on the metformin.  We will discuss further at the time of his follow-up after the ultrasound  Your amylase and lipase are normal

## 2024-03-04 ENCOUNTER — PATIENT MESSAGE (OUTPATIENT)
Dept: FAMILY MEDICINE CLINIC | Facility: CLINIC | Age: 54
End: 2024-03-04

## 2024-03-04 DIAGNOSIS — R10.11 RIGHT UPPER QUADRANT ABDOMINAL PAIN: Primary | ICD-10-CM

## 2024-03-04 NOTE — TELEPHONE ENCOUNTER
Pt called office to follow up on message sent. Pt notified we received message and Dr will get back as soon as he can. Pt stresses he is in extreme discomfort and would like a call ASAP. Pls advise

## 2024-03-04 NOTE — TELEPHONE ENCOUNTER
Spoke with patient, he reports Sat he was very bloated and in pain 10/10. He felt better after passing gas. He has not been eating much. Yesterday he had a small BM and passing lots of gas.     Today he is in pain rating 3-4/10, not much pressure. Please review US and advise on results.

## 2024-03-05 RX ORDER — METOPROLOL SUCCINATE 50 MG/1
50 TABLET, EXTENDED RELEASE ORAL DAILY
Qty: 90 TABLET | Refills: 0 | Status: SHIPPED | OUTPATIENT
Start: 2024-03-05

## 2024-03-05 RX ORDER — MONTELUKAST SODIUM 10 MG/1
10 TABLET ORAL NIGHTLY
Qty: 90 TABLET | Refills: 1 | Status: SHIPPED | OUTPATIENT
Start: 2024-03-05

## 2024-03-05 RX ORDER — OMEPRAZOLE 20 MG/1
CAPSULE, DELAYED RELEASE ORAL
Qty: 90 CAPSULE | Refills: 0 | Status: SHIPPED | OUTPATIENT
Start: 2024-03-05

## 2024-03-05 RX ORDER — MELATONIN
1000 DAILY
Qty: 90 TABLET | Refills: 0 | Status: SHIPPED | OUTPATIENT
Start: 2024-03-05

## 2024-03-05 RX ORDER — AMLODIPINE AND BENAZEPRIL HYDROCHLORIDE 5; 40 MG/1; MG/1
1 CAPSULE ORAL DAILY
Qty: 90 CAPSULE | Refills: 0 | Status: SHIPPED | OUTPATIENT
Start: 2024-03-05

## 2024-03-05 NOTE — TELEPHONE ENCOUNTER
Hypertension Medications Protocol Nkhzin5703/02/2024 05:27 AM   Protocol Details CMP or BMP in past 12 months    Last BP reading less than 140/90    In person appointment or virtual visit in the past 12 mos or appointment in next 3 mos    EGFRCR or GFRNAA > 50        LOV 2/27/24     LAST LAB  2/27/24    LAST RX 12/4/24 90     Next OV No future appointments.      PROTOCOL pass     Asthma & COPD Medication Protocol Yoyfff0503/02/2024 05:27 AM   Protocol Details Asthma Action Score greater than or equal to 20    AAP/ACT given in last 12 months    Appointment in past 6 or next 3 months        LOV 10/18/23     LAST LAB n/a     LAST RX  3/23/23    Next OV No future appointments.      PROTOCOL failed

## 2024-03-05 NOTE — TELEPHONE ENCOUNTER
Results of ultrasound discussed with the patient, fatty liver, right hepatic lobe cyst measuring about 3.0 cm and a mild splenomegaly.  But no cholecystitis cholelithiasis or any gallbladder issues  Unfortunately his symptoms of right-sided abdominal pain persists  Patient is advised treatment of his constipation with daily MiraLAX  If his symptoms do not resolve,referral was given to see Dr. Martin   Written by Aaron Pompa MD on 3/4/2024  7:30 PM CST  Seen by patient Lokesh Joyner on 3/4/2024 10:17 PM

## 2024-03-05 NOTE — PROGRESS NOTES
Results of ultrasound discussed with the patient, fatty liver, right hepatic lobe cyst measuring about 3.0 cm and a mild splenomegaly.  But no cholecystitis cholelithiasis or any gallbladder issues  Unfortunately his symptoms of right-sided abdominal pain persists  Patient is advised treatment of his constipation with daily MiraLAX  If his symptoms do not resolve,referral was given to see Dr. Martin

## 2024-03-18 ENCOUNTER — TELEPHONE (OUTPATIENT)
Dept: FAMILY MEDICINE CLINIC | Facility: CLINIC | Age: 54
End: 2024-03-18

## 2024-03-18 NOTE — TELEPHONE ENCOUNTER
Pt called LOV 2/27 stomach issues, started to feel like they were clearing up but symptoms returned completely Friday 3/15 and wants to know what his next step should be. Pls call

## 2024-03-18 NOTE — TELEPHONE ENCOUNTER
RN to pt call for condition update.  States he had initial improvement to GI symptoms doing MiraLax daily with occasional Milk of Magnesia doses, but feels as though his GI symptoms are getting bad again.  Reports a lot of bloating and gas-related pain, but doesn't pass a lot of actual flatus throughout the day.  Currently has loose/watery stool.  Pt waiting to see GI, appt in May.    Pt asking if MD can recommend anything while waiting for GI appt.  Please advise.

## 2024-03-19 RX ORDER — PANCRELIPASE LIPASE, PANCRELIPASE PROTEASE, PANCRELIPASE AMYLASE 40000; 126000; 168000 [USP'U]/1; [USP'U]/1; [USP'U]/1
1 CAPSULE, DELAYED RELEASE ORAL
Qty: 120 CAPSULE | Refills: 0 | Status: SHIPPED | OUTPATIENT
Start: 2024-03-19 | End: 2024-03-21

## 2024-03-19 NOTE — TELEPHONE ENCOUNTER
[phone]   Called pt to inform per PCP prescribed Zenpep to [facility] pharmacy on file. Pt will f/u if sx worsen, fail to improve, or any other concerns. No further questions. Pt verbalized understanding and agreed with POC.

## 2024-03-19 NOTE — TELEPHONE ENCOUNTER
Prescription of Zenpep sent to the pharmacy [facility]  Patient to take 1 capsule 10 minutes before meals  Is notify the patient

## 2024-03-21 ENCOUNTER — PATIENT MESSAGE (OUTPATIENT)
Dept: FAMILY MEDICINE CLINIC | Facility: CLINIC | Age: 54
End: 2024-03-21

## 2024-03-21 ENCOUNTER — OFFICE VISIT (OUTPATIENT)
Dept: FAMILY MEDICINE CLINIC | Facility: CLINIC | Age: 54
End: 2024-03-21
Payer: COMMERCIAL

## 2024-03-21 ENCOUNTER — LAB ENCOUNTER (OUTPATIENT)
Dept: LAB | Facility: HOSPITAL | Age: 54
End: 2024-03-21
Attending: FAMILY MEDICINE
Payer: COMMERCIAL

## 2024-03-21 VITALS
HEIGHT: 73 IN | OXYGEN SATURATION: 98 % | DIASTOLIC BLOOD PRESSURE: 74 MMHG | WEIGHT: 253 LBS | RESPIRATION RATE: 18 BRPM | HEART RATE: 80 BPM | TEMPERATURE: 97 F | SYSTOLIC BLOOD PRESSURE: 110 MMHG | BODY MASS INDEX: 33.53 KG/M2

## 2024-03-21 DIAGNOSIS — R19.7 DIARRHEA, UNSPECIFIED TYPE: Primary | ICD-10-CM

## 2024-03-21 PROCEDURE — 99214 OFFICE O/P EST MOD 30 MIN: CPT | Performed by: FAMILY MEDICINE

## 2024-03-21 NOTE — PROGRESS NOTES
/74   Pulse 80   Temp 97.2 °F (36.2 °C) (Temporal)   Resp 18   Ht 6' 1\" (1.854 m)   Wt 253 lb (114.8 kg)   SpO2 98%   BMI 33.38 kg/m²               Chief Complaint   Patient presents with    Weight Loss    Diarrhea        HPI;    Lokesh Joyner is a 53 year old male.  Who is here for follow-up on his persistent diarrhea recurrent abdominal bloating and weight loss.  Workup has been negative for H. pylori  Workup included CBC that was normal, CMP showed slightly low sodium and elevated liver enzymes which are chronic negative amylase and lipase  Modifying diet and treatment with PPI has not been effective  Patient has been having persistent diarrhea  The diarrhea is watery and 2-3 times a day  Patient has avoided dairy, high-fiber diet and treated with electrolytes and clear liquids for days without any help  Denies any fever nausea or vomiting  His symptoms have been waxing and waning, he has been feeling better but then gets explosive diarrhea  His abdominal bloating and discomfort has been moving from right upper quadrant to left upper quadrant to right lower quadrant at times    Today the patient brought in pictures of his stool that he observed leaf-like structures in the diarrhea patient states that has been weeks since she had salad          Wt Readings from Last 3 Encounters:   03/21/24 253 lb (114.8 kg)   02/27/24 267 lb (121.1 kg)   10/18/23 262 lb (118.8 kg)     BP Readings from Last 3 Encounters:   03/21/24 110/74   02/27/24 118/64   10/18/23 128/78         ALLERGIES:  No Known Allergies  Current Outpatient Medications   Medication Sig Dispense Refill    metoprolol succinate ER 50 MG Oral Tablet 24 Hr Take 1 tablet by mouth daily. 90 tablet 0    montelukast 10 MG Oral Tab Take 1 tablet (10 mg total) by mouth nightly. 90 tablet 1    AMLODIPINE BESY-BENAZEPRIL HCL 5-40 MG Oral Cap Take 1 capsule by mouth daily. 90 capsule 0    OMEPRAZOLE 20 MG Oral Capsule Delayed Release Take 1 capsule  by mouth every morning. 90 capsule 0    cyanocobalamin 1000 MCG Oral Tab Take 1 tablet (1,000 mcg total) by mouth daily. 90 tablet 0    hydroCHLOROthiazide 12.5 MG Oral Tab Take 1 tablet by mouth once daily. 90 tablet 1    metFORMIN 500 MG Oral Tab Take 1 tablet (500 mg total) by mouth 2 (two) times daily with meals. 180 tablet 1    cetirizine 10 MG Oral Tab Take 1 tablet (10 mg total) by mouth daily. 90 tablet 1    clobetasol 0.05 % External Cream       ibuprofen 200 MG Oral Tab Take 4 tablets (800 mg total) by mouth 2 (two) times daily as needed for Pain.      Calcium Polycarbophil (FIBER) 625 MG Oral Tab Take 2 tablets (1,250 mg total) by mouth daily.      Albuterol Sulfate (PROVENTIL HFA IN) Inhale 2 Inhalers into the lungs as needed.      nitazoxanide 500 MG Oral Tab Take 1 tablet (500 mg total) by mouth 2 (two) times daily with meals for 3 days. 6 tablet 0      Past Medical History:   Diagnosis Date    Condyloma     Diabetes (HCC)     Esophageal reflux     Essential hypertension     Extrinsic asthma, unspecified     / in good control    Spinal stenosis       Social History:  Social History     Socioeconomic History    Marital status:    Tobacco Use    Smoking status: Former    Smokeless tobacco: Never    Tobacco comments:     smoked in college   Substance and Sexual Activity    Alcohol use: Yes     Comment: 2-3 x week    Drug use: Yes     Types: Cannabis     Comment: medical use-has card        REVIEW OF SYSTEMS:   GENERAL HEALTH: feels well otherwise  SKIN: denies any unusual skin lesions or rashes  RESPIRATORY: denies shortness of breath with exertion  CARDIOVASCULAR: denies chest pain on exertion  GI: denies abdominal pain and denies heartburn  NEURO: denies headaches  EXAM:   /74   Pulse 80   Temp 97.2 °F (36.2 °C) (Temporal)   Resp 18   Ht 6' 1\" (1.854 m)   Wt 253 lb (114.8 kg)   SpO2 98%   BMI 33.38 kg/m²   GENERAL: well developed, well nourished,in no apparent distress  SKIN: no  rashes,no suspicious lesions  HEENT: atraumatic, normocephalic,  NECK: supple,no adenopathy,no bruits  LUNGS: clear to auscultation  CARDIO: RRR without murmur  GI: good BS's,no masses, HSM or tenderness      ASSESSMENT AND PLAN:   Diagnoses and all orders for this visit:    Diarrhea, unspecified type  We had a long discussion regarding the differential diagnosis that may include irritable bowel syndrome inflammatory bowel disease or infectious diarrhea my plan at this time is to get stool for ova and parasite  If negative we will get a CT of his abdomen and pelvis  Also planning to send him to gastroenterology if the diarrhea does not resolve  Stool sent for ova and parasite    Giardia + Crypto Antigen, Stool; Future  -     OVA AND PARASITES [681]; Future    Time spent at appointment today is 35 minutes including preparing to see patient, reviewing test results, performing medically appropriate examination and evaluation and coordinating care, counseling and educating patient/family, ordering medications and testing, and documenting clinical information in EMR.      The patient indicates understanding of these issues and agrees to the plan.  Imaging & Consults:  None  Meds & Refills for this Visit:  Requested Prescriptions      No prescriptions requested or ordered in this encounter     Orders Placed This Encounter   Procedures    Giardia + Crypto Antigen, Stool    OVA AND PARASITES [[id number]]             Aaron Pompa MD   [facility]  [address] [place] [id number]    Electronically signed    This dictation was performed with a verbal recognition program (DRAGON) and it was checked for errors. It is possible that there are still dictated errors within this office note. If so, please bring any errors to my attention for an addendum. All efforts were made to ensure that this office note is accurate

## 2024-03-22 ENCOUNTER — LAB ENCOUNTER (OUTPATIENT)
Dept: LAB | Age: 54
End: 2024-03-22
Attending: FAMILY MEDICINE
Payer: COMMERCIAL

## 2024-03-22 DIAGNOSIS — R19.7 DIARRHEA, UNSPECIFIED TYPE: ICD-10-CM

## 2024-03-22 LAB
CRYPTOSP AG STL QL IA: POSITIVE
G LAMBLIA AG STL QL IA: NEGATIVE

## 2024-03-22 PROCEDURE — 87272 CRYPTOSPORIDIUM AG IF: CPT

## 2024-03-22 PROCEDURE — 87177 OVA AND PARASITES SMEARS: CPT

## 2024-03-22 PROCEDURE — 87329 GIARDIA AG IA: CPT

## 2024-03-22 PROCEDURE — 87209 SMEAR COMPLEX STAIN: CPT

## 2024-03-22 RX ORDER — NITAZOXANIDE 500 MG/1
500 TABLET ORAL 2 TIMES DAILY WITH MEALS
Qty: 6 TABLET | Refills: 0 | Status: SHIPPED | OUTPATIENT
Start: 2024-03-22 | End: 2024-03-25

## 2024-03-23 NOTE — PROGRESS NOTES
Your stool is positive for Cryptosporidium      Cryptosporidium is an intracellular protozoan parasite that is associated with self-limited diarrhea in immunocompetent hosts   Immunocompetent patients usually have a spontaneous recovery within a few days to weeks and parasitologic cure within a few weeks to months without requiring any specific therapy.  For immunocompetent patients who have severe acute symptoms or symptoms for greater than 14 days, we recommend nitazoxanide for three days, rather than supportive care alone     I am sending a prescription of Nitazoxanide to your pharmacy    Let me know how you feel in 3 days

## 2024-04-01 NOTE — TELEPHONE ENCOUNTER
Diabetes Medication Protocol [id number]/31/2024 04:16 AM   Protocol Details Microalbumin procedure in past 12 months or taking ACE/ARB    Last A1C < 7.5 and within past 6 months    In person appointment or virtual visit in the past 6 mos or appointment in next 3 mos    EGFRCR or GFRNAA > 50    GFR in the past 12 months          LOV 3/21/24 dr pino     LAST LAB  2/27/24     LAST RX  10/20/23 180 with 1     Next OV No future appointments.      PROTOCOL failed

## 2024-04-12 ENCOUNTER — TELEPHONE (OUTPATIENT)
Dept: FAMILY MEDICINE CLINIC | Facility: CLINIC | Age: 54
End: 2024-04-12

## 2024-04-12 NOTE — TELEPHONE ENCOUNTER
RN to pt call for triage of symptoms.      States last week he experienced a pain in his low back that was unlike his usual back pain.  He has tried stretching/positioning aids but they have not been helpful.  Ice and high doses of Ibuprofen helpful.  The pain has gotten progressively worse over the last 3 days, now his L leg and toes are numb.  Denies any symptoms on the right side of his body, states he has been having weird pain/itches to his groin/pelvis, difficulty urinating as it feels like a muscle spasm/strain there.    States he has a hx of herniated disc in neck x2, lumbar issues but no herniated discs to lower back that would explain the pain.  He did spend a significant amount of time on toilet during recent GI issue, states abdominal/groin pain and numbness symptoms started a few days after finishing recent Nitazoxanide 500mg rx.    Pt states he is OK to wait until next week for OV if MD does not think he is at risk for permanent nerve damage from the numbness.  Advised MD is out of office today, if symptoms progress to seek care at UC/ED, verbalized understanding.  Is asking if there are any tests/scans that can be done for further evaluation of numbness in preparation of upcoming OV.  Also asking if any additional home care interventions can be recommended.  Please advise.

## 2024-04-12 NOTE — TELEPHONE ENCOUNTER
RN to pt call, unable to leave VM on unidentified mailbox.  Informed to check MC for message with MD recommendations.  MC message sent.

## 2024-04-12 NOTE — TELEPHONE ENCOUNTER
Pt called, has an appointment on 4-16-24 @ 12:15 for Pain in lower left back and leg.     Pt said progressively getting worse.  He said last 2 days foot has been numb.    Should he wait to be seen until 4-16-24.  Worried about nerve damage.

## 2024-04-12 NOTE — TELEPHONE ENCOUNTER
Additional testing can be ordered after he is evaluated  If his symptoms worsen he should go to the emergency room or urgent care  Continue the supportive care with heating pad, Tylenol, ibuprofen

## 2024-04-16 ENCOUNTER — OFFICE VISIT (OUTPATIENT)
Dept: FAMILY MEDICINE CLINIC | Facility: CLINIC | Age: 54
End: 2024-04-16
Payer: COMMERCIAL

## 2024-04-16 VITALS
OXYGEN SATURATION: 97 % | BODY MASS INDEX: 33.13 KG/M2 | RESPIRATION RATE: 18 BRPM | SYSTOLIC BLOOD PRESSURE: 108 MMHG | TEMPERATURE: 97 F | WEIGHT: 250 LBS | HEIGHT: 73 IN | DIASTOLIC BLOOD PRESSURE: 60 MMHG | HEART RATE: 79 BPM

## 2024-04-16 DIAGNOSIS — M54.50 ACUTE LEFT-SIDED LOW BACK PAIN WITHOUT SCIATICA: Primary | ICD-10-CM

## 2024-04-16 DIAGNOSIS — R20.2 NUMBNESS AND TINGLING OF LEFT LOWER EXTREMITY: ICD-10-CM

## 2024-04-16 DIAGNOSIS — R20.0 NUMBNESS AND TINGLING OF LEFT LOWER EXTREMITY: ICD-10-CM

## 2024-04-16 PROCEDURE — 99214 OFFICE O/P EST MOD 30 MIN: CPT | Performed by: FAMILY MEDICINE

## 2024-04-16 RX ORDER — TIZANIDINE 2 MG/1
2 TABLET ORAL NIGHTLY
Qty: 20 TABLET | Refills: 0 | Status: SHIPPED | OUTPATIENT
Start: 2024-04-16

## 2024-04-16 NOTE — PROGRESS NOTES
/60   Pulse 79   Temp 97.2 °F (36.2 °C) (Temporal)   Resp 18   Ht 6' 1\" (1.854 m)   Wt 250 lb (113.4 kg)   SpO2 97%   BMI 32.98 kg/m²               Chief Complaint   Patient presents with    Low Back Pain        HPI;    Lokesh Joyner is a 54 year old male.  Sacral pain  2 weeks   4-6/10  Numbness pins needle sensation initially one of the left foot and now going up to mid calf area in the back of the leg-  Bm - makes the pain worse, the pain is relieved after the bowel movement  H/o herniated discs in lumbar spine when he was playing football when he was young  No history of any recent trauma or falls    Patient just recovered from diarrhea due to cryptosporidium-he no longer has diarrhea    Wt Readings from Last 3 Encounters:   04/16/24 250 lb (113.4 kg)   03/21/24 253 lb (114.8 kg)   02/27/24 267 lb (121.1 kg)     BP Readings from Last 3 Encounters:   04/16/24 108/60   03/21/24 110/74   02/27/24 118/64         ALLERGIES:  No Known Allergies  Current Outpatient Medications   Medication Sig Dispense Refill    tiZANidine 2 MG Oral Tab Take 1 tablet (2 mg total) by mouth nightly. 20 tablet 0    metFORMIN 500 MG Oral Tab Take 1 tablet (500 mg total) by mouth 2 (two) times daily with meals. 180 tablet 0    metoprolol succinate ER 50 MG Oral Tablet 24 Hr Take 1 tablet by mouth daily. 90 tablet 0    montelukast 10 MG Oral Tab Take 1 tablet (10 mg total) by mouth nightly. 90 tablet 1    AMLODIPINE BESY-BENAZEPRIL HCL 5-40 MG Oral Cap Take 1 capsule by mouth daily. 90 capsule 0    OMEPRAZOLE 20 MG Oral Capsule Delayed Release Take 1 capsule by mouth every morning. 90 capsule 0    cyanocobalamin 1000 MCG Oral Tab Take 1 tablet (1,000 mcg total) by mouth daily. 90 tablet 0    hydroCHLOROthiazide 12.5 MG Oral Tab Take 1 tablet by mouth once daily. 90 tablet 1    cetirizine 10 MG Oral Tab Take 1 tablet (10 mg total) by mouth daily. 90 tablet 1    clobetasol 0.05 % External Cream       ibuprofen 200 MG Oral  Tab Take 4 tablets (800 mg total) by mouth 2 (two) times daily as needed for Pain.      Calcium Polycarbophil (FIBER) 625 MG Oral Tab Take 2 tablets (1,250 mg total) by mouth daily.      Albuterol Sulfate (PROVENTIL HFA IN) Inhale 2 Inhalers into the lungs as needed.        Past Medical History:    Condyloma    Diabetes (HCC)    Esophageal reflux    Essential hypertension    Extrinsic asthma, unspecified    / in good control    Spinal stenosis      Social History:  Social History     Socioeconomic History    Marital status:    Tobacco Use    Smoking status: Former    Smokeless tobacco: Never    Tobacco comments:     smoked in college   Substance and Sexual Activity    Alcohol use: Yes     Comment: 2-3 x week    Drug use: Yes     Types: Cannabis     Comment: medical use-has card        REVIEW OF SYSTEMS:   GENERAL HEALTH: feels well otherwise  SKIN: denies any unusual skin lesions or rashes  RESPIRATORY: denies shortness of breath with exertion  CARDIOVASCULAR: denies chest pain on exertion  GI: denies abdominal pain and denies heartburn  NEURO: denies headaches  EXAM:   /60   Pulse 79   Temp 97.2 °F (36.2 °C) (Temporal)   Resp 18   Ht 6' 1\" (1.854 m)   Wt 250 lb (113.4 kg)   SpO2 97%   BMI 32.98 kg/m²   GENERAL: well developed, well nourished,in no apparent distress  SKIN: no rashes,no suspicious lesions  HEENT: atraumatic, normocephalic,ears and throat are clear  NECK: supple,no adenopathy,no bruits  LUNGS: clear to auscultation  CARDIO: RRR without murmur  GI: good BS's,no masses, HSM or tenderness  EXTREMITIES: no cyanosis, clubbing or edema  Sacral tenderness located on the left side-mild  Left hip-full range of motion SLR 90 degrees  Knee/ankle-full range of motion, no swelling or redness    ASSESSMENT AND PLAN:   Diagnoses and all orders for this visit:    Acute left-sided low back pain without sciatica  Numbness and tingling of left lower extremity  We discussed the differential diagnosis  that includes lumbar radiculopathy secondary to herniated discs  Plan at this time is to get a CT scan of the lumbar sacral spine  Patient is advised to continue ibuprofen as needed  Adding Zanaflex at bedtime CT SPINE LUMBAR (CPT=72131); Future  -     CT SACRUM (CPT=72192); Future  -     tiZANidine 2 MG Oral Tab; Take 1 tablet (2 mg total) by mouth nightly.  Time spent at appointment today is 30 minutes including preparing to see patient, reviewing test results, performing medically appropriate examination and evaluation and coordinating care, counseling and educating patient/family, ordering medications and testing, and documenting clinical information in EMR.      The patient indicates understanding of these issues and agrees to the plan.  Imaging & Consults:  CT SPINE LUMBAR (CPT=72131)  CT SACRUM (CPT=72192)  Meds & Refills for this Visit:  Requested Prescriptions     Signed Prescriptions Disp Refills    tiZANidine 2 MG Oral Tab 20 tablet 0     Sig: Take 1 tablet (2 mg total) by mouth nightly.     No orders of the defined types were placed in this encounter.            Aaron Pompa MD   [facility]  [address] [place] [place] [id number]    Electronically signed    This dictation was performed with a verbal recognition program (DRAGON) and it was checked for errors. It is possible that there are still dictated errors within this office note. If so, please bring any errors to my attention for an addendum. All efforts were made to ensure that this office note is accurate

## 2024-04-22 ENCOUNTER — PATIENT MESSAGE (OUTPATIENT)
Dept: ADMINISTRATIVE | Age: 54
End: 2024-04-22

## 2024-04-22 NOTE — TELEPHONE ENCOUNTER
[facility]  online for status      CT SPINE LUMBAR (CPT=72131)            Referral #: [id number]       Scheduled For: 04/23/2024      Status: pending authorization > To discuss this case with the reviewer, contact [facility] at [phone]. .     Use Reference Case Number: [id number]

## 2024-04-22 NOTE — TELEPHONE ENCOUNTER
[facility]  online for status      CT SACRUM (CPT=72192)          Referral #: [id number]       Scheduled For: 04/23/2024     Status: pending authorization > To discuss this case with the reviewer, contact [facility] at [phone]. .     Use Reference Case Number: [id number]       Additional Clinical notes sent for review.     Notified patient of pending status via MC

## 2024-04-23 ENCOUNTER — PATIENT MESSAGE (OUTPATIENT)
Dept: FAMILY MEDICINE CLINIC | Facility: CLINIC | Age: 54
End: 2024-04-23

## 2024-04-27 ENCOUNTER — HOSPITAL ENCOUNTER (OUTPATIENT)
Dept: CT IMAGING | Facility: HOSPITAL | Age: 54
Discharge: HOME | End: 2024-04-27
Attending: FAMILY MEDICINE
Payer: COMMERCIAL

## 2024-04-27 DIAGNOSIS — M54.50 ACUTE LEFT-SIDED LOW BACK PAIN WITHOUT SCIATICA: ICD-10-CM

## 2024-04-27 PROCEDURE — 72131 CT LUMBAR SPINE W/O DYE: CPT | Performed by: FAMILY MEDICINE

## 2024-04-27 PROCEDURE — 72192 CT PELVIS W/O DYE: CPT | Performed by: FAMILY MEDICINE

## 2024-04-30 ENCOUNTER — TELEPHONE (OUTPATIENT)
Dept: FAMILY MEDICINE CLINIC | Facility: CLINIC | Age: 54
End: 2024-04-30

## 2024-04-30 DIAGNOSIS — M48.07 SPINAL STENOSIS OF LUMBOSACRAL REGION: Primary | ICD-10-CM

## 2024-04-30 NOTE — TELEPHONE ENCOUNTER
Patient reviewed Dr. Pompa's result note on CT scan.     Treatment--physical therapy and pain medications as well as muscle relaxants     Patient is requesting a more aggressive POC.  He states he can't tolerate the ongoing severe pain he is having in the anal/groin/genital area.  States he get waves of burning-aching pain in the area including the penis and testicles.  States it's intolerable.  The muscle relaxant has helped only slightly.  States back pain in the least of his problems.  He is unable to urinate when he feels the urge to go.  He is also unable to pass stool when he has the urge to go.  Denies any incontinence.  Denies constipation.  States when he is able to pass stool it is soft. Taking stool softeners or fiber supplements only make gas worse. He continues to have left leg weakness with N/T from toes up to his calf with pain.  Symptoms have been ongoing for weeks and are worsening.  He would like to see a specialist.  States \"if they need to cut me open to fix this, that's what I'll do!\" He is afraid there's something very bad going on.    Dr. Pompa, please advise regarding request for referral.

## 2024-04-30 NOTE — TELEPHONE ENCOUNTER
From: Lokesh Joyner  To: Aaron Pompa  Sent: 4/23/2024 12:51 PM CDT  Subject: CT delayed by insurance and change in symptoms    Helgiselle Pompa,    My CT scans (scheduled for today) were delayed due to insurance. Also, an update on my symptoms as they have changed:    While the pain in my back (and numbness in my leg) is about the same, I'm also seeing a lot more pain in the rectal area--often like I need to go to the bathroom, but there's nothing to pass. This is getting to be constant tho ice alleviates it.    Anything more we can do than to wait for the CT scans? Is there something else we can rule out/check?    Here's the full list of my current symptoms (all of these pains are intermittent, except the first one):  * Burning/aching pain L4/S1 area. Responds to ice, ibuprofen, muscle relaxer, sitting very upright.  * Intermittent sciatica (helped by ice too)  * Persistent numbness and tingling in left toes, foot, ankle and calf.  * Pain in and around the anus. This pain varies from itching to aching.  * Similar pain at the base of the testicles  * Pain/hypersensitivity in tip and along side of penis.  * Internal cramping pain after intercourse.  * Frequent and sometimes difficult urination  * Difficulty in bowel movements (the stool itself is normal, tho narrow)  * Enlarged right testicle (possibly). This is tricky because, it's already much larger than it used to be. I had it checked a few years and it was found to be nothing, but felt it worth mentioning as it still seems odd, and there does seem to be pain involved (but with all the others, it's hard to tell)

## 2024-04-30 NOTE — PROGRESS NOTES
No fractures seen, there is mild narrowing of the lumbar spine causing spinal stenosis at different levels from L2-L3, L3-L4 L4-L5,, as well as L5-S1  There is mild bilateral foraminal stenosis at L2-L3  Mild to moderate foraminal stenosis at L3-L4 as well as L4-L5  Moderate right and severe left neuroforaminal stenosis at L5-S1    Treatment--physical therapy and pain medications as well as muscle relaxants

## 2024-05-01 ENCOUNTER — TELEPHONE (OUTPATIENT)
Dept: ORTHOPEDICS CLINIC | Facility: CLINIC | Age: 54
End: 2024-05-01

## 2024-05-01 DIAGNOSIS — M54.50 LOW BACK PAIN, UNSPECIFIED BACK PAIN LATERALITY, UNSPECIFIED CHRONICITY, UNSPECIFIED WHETHER SCIATICA PRESENT: Primary | ICD-10-CM

## 2024-05-01 NOTE — TELEPHONE ENCOUNTER
Future Appointments   Date Time Provider Department Center   5/2/2024  9:40 AM Joel Smyth MD [facility]

## 2024-05-01 NOTE — TELEPHONE ENCOUNTER
Future Appointments   Date Time Provider Department Center   5/2/2024  9:40 AM Joel Smyth MD [facility]       This patient is coming for Lower back issue. No prior imaging done yet. Please advise if views are needed for this appt. Thanks.    Patient may be reached at [phone]

## 2024-05-01 NOTE — TELEPHONE ENCOUNTER
Called patient and advised of referral to Ortho Spine, Dr. Smyth.  Contact information given.  States his pelvic pain is so bad today he is unable to get out of bed.  He has also not had a BM for two days although he feels like the stool is there.  Does take a daily fiber supplement.

## 2024-05-01 NOTE — TELEPHONE ENCOUNTER
Referral sent to orthospine to see Dr. Smyth  Please notify the patient to call and make appointment

## 2024-05-02 ENCOUNTER — OFFICE VISIT (OUTPATIENT)
Dept: ORTHOPEDICS CLINIC | Facility: CLINIC | Age: 54
End: 2024-05-02
Payer: COMMERCIAL

## 2024-05-02 ENCOUNTER — HOSPITAL ENCOUNTER (OUTPATIENT)
Dept: GENERAL RADIOLOGY | Age: 54
Discharge: HOME | End: 2024-05-02
Attending: STUDENT IN AN ORGANIZED HEALTH CARE EDUCATION/TRAINING PROGRAM
Payer: COMMERCIAL

## 2024-05-02 DIAGNOSIS — M48.062 SPINAL STENOSIS OF LUMBAR REGION WITH NEUROGENIC CLAUDICATION: Primary | ICD-10-CM

## 2024-05-02 DIAGNOSIS — M54.50 LOW BACK PAIN, UNSPECIFIED BACK PAIN LATERALITY, UNSPECIFIED CHRONICITY, UNSPECIFIED WHETHER SCIATICA PRESENT: ICD-10-CM

## 2024-05-02 DIAGNOSIS — M54.50 ACUTE LEFT-SIDED LOW BACK PAIN WITHOUT SCIATICA: ICD-10-CM

## 2024-05-02 PROCEDURE — 99204 OFFICE O/P NEW MOD 45 MIN: CPT | Performed by: STUDENT IN AN ORGANIZED HEALTH CARE EDUCATION/TRAINING PROGRAM

## 2024-05-02 PROCEDURE — 72100 X-RAY EXAM L-S SPINE 2/3 VWS: CPT | Performed by: STUDENT IN AN ORGANIZED HEALTH CARE EDUCATION/TRAINING PROGRAM

## 2024-05-02 RX ORDER — METHYLPREDNISOLONE 4 MG/1
TABLET ORAL
Qty: 21 TABLET | Refills: 0 | Status: SHIPPED | OUTPATIENT
Start: 2024-05-02

## 2024-05-02 NOTE — H&P
[facility] - ORTHOPEDICS  [address], [address] [address]  [address] [place] [address]  [phone]     NEW PATIENT VISIT - HISTORY AND PHYSICAL EXAMINATION     Name: Lokesh Joyner   MRN: [id number]  Date: 05/02/24       CC: Back and leg pain/numbness    REFERRED BY: Aaron Pompa MD    HPI:   Lokesh Joyner is a very pleasant 54 year old male who presents today for evaluation of back and leg pain. The distribution of symptoms are: 10% backpain and 90% leg pain. The symptoms began 3 month(s) ago without any significant injury. Since the onset, the symptoms have become slowly worse over time. Patient feels pain is aggravated by walking, bending, standing and improved by rest. The patient reports  numbness and  weakness.  The symptom characteristics are as follows: Patient is a 54-year-old male with history of low back pain radiating down predominantly left lower extremity including lateral thigh and leg as well as anterior groin inner thigh.  Symptoms have been present for approximately 3 months refractory to anti-inflammatory medications.  CT scan was recently completed.     Prior spine surgery: none.    Bowel and bladder symptoms: absent.    The patient has not had issues with balance and/or hand dexterity problems such as changes in penmanship or the use of buttons or zippers.    Treatment up to this time has included:    Evaluation: PCP  NSAIDS: have worked well  Narcotic use: None  Physical therapy: None  Spinal injections: None  Others:       PMH:   Past Medical History:    Condyloma    Diabetes (HCC)    Esophageal reflux    Essential hypertension    Extrinsic asthma, unspecified    / in good control    Spinal stenosis       PAST SURGICAL HX:  Past Surgical History:   Procedure Laterality Date    Injection, anesthetic/steroid, transforaminal epidural; cervical/thoracic, single level  sept 2016    x3    Other      x2 surgical procedures on mouth/  gumline and tongue    Other      laser ablation condyloma       FAMILY HX:  History reviewed. No pertinent family history.    ALLERGIES:  Patient has no known allergies.    MEDICATIONS:   Current Outpatient Medications   Medication Sig Dispense Refill    methylPREDNISolone (MEDROL) 4 MG Oral Tablet Therapy Pack As directed. 21 tablet 0    tiZANidine 2 MG Oral Tab Take 1 tablet (2 mg total) by mouth nightly. 20 tablet 0    metFORMIN 500 MG Oral Tab Take 1 tablet (500 mg total) by mouth 2 (two) times daily with meals. 180 tablet 0    metoprolol succinate ER 50 MG Oral Tablet 24 Hr Take 1 tablet by mouth daily. 90 tablet 0    montelukast 10 MG Oral Tab Take 1 tablet (10 mg total) by mouth nightly. 90 tablet 1    AMLODIPINE BESY-BENAZEPRIL HCL 5-40 MG Oral Cap Take 1 capsule by mouth daily. 90 capsule 0    OMEPRAZOLE 20 MG Oral Capsule Delayed Release Take 1 capsule by mouth every morning. 90 capsule 0    cyanocobalamin 1000 MCG Oral Tab Take 1 tablet (1,000 mcg total) by mouth daily. 90 tablet 0    hydroCHLOROthiazide 12.5 MG Oral Tab Take 1 tablet by mouth once daily. 90 tablet 1    cetirizine 10 MG Oral Tab Take 1 tablet (10 mg total) by mouth daily. 90 tablet 1    clobetasol 0.05 % External Cream       ibuprofen 200 MG Oral Tab Take 4 tablets (800 mg total) by mouth 2 (two) times daily as needed for Pain.      Calcium Polycarbophil (FIBER) 625 MG Oral Tab Take 2 tablets (1,250 mg total) by mouth daily.      Albuterol Sulfate (PROVENTIL HFA IN) Inhale 2 Inhalers into the lungs as needed.         ROS: A comprehensive 14 point review of systems was performed and was negative aside from the aforementioned per history of present illness.    SOCIAL HX:  Social History     Tobacco Use    Smoking status: Former    Smokeless tobacco: Never    Tobacco comments:     smoked in college   Substance Use Topics    Alcohol use: Yes     Comment: 2-3 x week           PE:   There were no vitals filed for this visit.  Estimated body  mass index is 32.98 kg/m² as calculated from the following:    Height as of 4/16/24: 6' 1\" (1.854 m).    Weight as of 4/16/24: 250 lb (113.4 kg).    Physical Exam  Constitutional:       Appearance: Normal appearance.   HENT:      Head: Normocephalic and atraumatic.   Eyes:      Extraocular Movements: Extraocular movements intact.   Cardiovascular:      Pulses: Normal pulses. Skin warm and well perfused.  Pulmonary:      Effort: Pulmonary effort is normal. No respiratory distress.   Skin:     General: Skin is warm.   Psychiatric:         Mood and Affect: Mood normal.     Spine Exam:    Normal gait without difficulty  Able to heel, toe, tandem gait without difficulty  Level shoulders and hips in even stance    Restricted L-spine ROM    No tenderness to palpation of L-spine    Straight leg raise test: negative    Sustained clonus: negative    LE Strength: 5/5 IP QUAD TA EHL GSC  LE Sensation: normal in L2-S1 distribution  LE reflexes: normal    Radiographic Examination/Diagnostics:  XR and CT personally viewed, independently interpreted and radiology report was reviewed.  X-ray of the lumbar spine demonstrates degenerative disc disease in the lower lumbar segments  CT scan of the lumbar spine demonstrates congenital canal narrowing with multilevel stenosis    IMPRESSION: Lokesh Joyner is a 54 year old male with lumbar stenosis    PLAN:     We reviewed the patients history, symptoms, exam findings, and imaging today.  We had a detailed discussion outlining the etiology, anatomy, pathophysiology, and natural history of lumbar stenosis. The typical management of this condition may include lifestyle modification, NSAIDs, physical therapy, oral steroids, epidural injections, neuromodulatory medications, and sometimes pain medications.  Based on our discussion today we would like to have the patient initiate our recommendations for continued conservative therapy in the treatment of their condition noted in the  assessment section.     - Prescribed oral steroids  - provided home exercises  - Referred to Physical Therapy: home exercise program, aerobic exercises, core strengthening and conditioning, possible manipulative therapy,  and modalities as indicated  - Referred patient for [facility]  - If symptoms do not improve, consider MRI and surgical discussion     FOLLOW-UP:  We will see him back in follow-up in one month, or sooner if any problems arise. Patient understands and agrees with plan.      Joel Smyth MD  Orthopedic Spine Surgeon  [facility]   [address] [place] [address].[facility]  [email or web address]  t: [phone]   f: [phone]        This note was dictated using Dragon software.  While it was briefly proofread prior to completion, some grammatical, spelling, and word choice errors due to dictation may still occur.

## 2024-05-03 RX ORDER — TIZANIDINE 2 MG/1
2 TABLET ORAL NIGHTLY
Qty: 20 TABLET | Refills: 0 | OUTPATIENT
Start: 2024-05-03

## 2024-05-09 ENCOUNTER — TELEPHONE (OUTPATIENT)
Dept: FAMILY MEDICINE CLINIC | Facility: CLINIC | Age: 54
End: 2024-05-09

## 2024-05-09 DIAGNOSIS — K59.00 DIFFICULTY DEFECATING: Primary | ICD-10-CM

## 2024-05-09 NOTE — TELEPHONE ENCOUNTER
I will suggest seeing the colorectal surgeon then me trying to figure out  Referral was given for colorectal surgery  Patient to call and make appointment

## 2024-05-09 NOTE — TELEPHONE ENCOUNTER
Pt calling to request imaging of his pelvis/rectum area.  States he has had a number of issues for the past few months, limited improvement with various imaging/referrals/plans of care.  He just completed a round of Prednisone and states 95% of his pain is gone.  Now that the other issues are not present he is realizing the abd/back pain was likely related to whatever issue he is having in his rectum area.  Says it feels as though there is a blockage right at the entrance of his rectum, does not feel IBS in nature, not wishing to continue stool softener/Miralax as he is having formed stools.  States his stools are soft/formed, but very skinny/ribbon-like.  Feels as though he can feel the stool moving through his intestine, and then when he attempts to bear down he feels as though there is a softball there preventing most of the stool from passing and thus creating the ribbon-like stool.  Is concerned there could be a tumor at the base of his rectum.  Has had x-ray & CT lumbar/sacrum in past, colonoscopy ~3 years ago.  Hx of groin/testicle pain, R testicle is larger than other, difficulty urinating at times.  Testing at that time was normal but pt states his current pain is right in that same area.    Pt is concerned and wants action.  Is requesting imaging for additional information regarding his \"pelvic/rectum structures.\"  Please advise.

## 2024-05-09 NOTE — TELEPHONE ENCOUNTER
Called and spoke to pt. Advised to see colorectal surgeon, offered info or can place info in MC. Pt stated understanding and agreeable to plan, would like MC with info. MC sent.

## 2024-05-10 ENCOUNTER — OFFICE VISIT (OUTPATIENT)
Dept: PAIN CLINIC | Facility: CLINIC | Age: 54
End: 2024-05-10
Payer: COMMERCIAL

## 2024-05-10 VITALS
OXYGEN SATURATION: 100 % | BODY MASS INDEX: 31 KG/M2 | SYSTOLIC BLOOD PRESSURE: 110 MMHG | HEART RATE: 75 BPM | WEIGHT: 233 LBS | DIASTOLIC BLOOD PRESSURE: 64 MMHG

## 2024-05-10 DIAGNOSIS — M48.062 SPINAL STENOSIS OF LUMBAR REGION WITH NEUROGENIC CLAUDICATION: Primary | ICD-10-CM

## 2024-05-10 PROCEDURE — 99204 OFFICE O/P NEW MOD 45 MIN: CPT | Performed by: PHYSICIAN ASSISTANT

## 2024-05-10 NOTE — PATIENT INSTRUCTIONS
Refill policies:    Allow 2-3 business days for refills; controlled substances may take longer.  Contact your pharmacy at least 5 days prior to running out of medication and have them send an electronic request or submit request through the “request refill” option in your [facility] account.  Refills are not addressed on weekends; covering physicians do not authorize routine medications on weekends.  No narcotics or controlled substances are refilled after noon on Fridays or by on call physicians.  By law, narcotics must be electronically prescribed.  A 30 day supply with no refills is the maximum allowed.  If your prescription is due for a refill, you may be due for a follow up appointment.  To best provide you care, patients receiving routine medications need to be seen at least once a year.  Patients receiving narcotic/controlled substance medications need to be seen at least once every 3 months.  In the event that your preferred pharmacy does not have the requested medication in stock (e.g. Backordered), it is your responsibility to find another pharmacy that has the requested medication available.  We will gladly send a new prescription to that pharmacy at your request.    Scheduling Tests:    If your physician has ordered radiology tests such as MRI or CT scans, please contact Central Scheduling at [phone] right away to schedule the test.  Once scheduled, the [facility] Centralized Referral Team will work with your insurance carrier to obtain pre-certification or prior authorization.  Depending on your insurance carrier, approval may take 3-10 days.  It is highly recommended patients assure they have received an authorization before having a test performed.  If test is done without insurance authorization, patient may be responsible for the entire amount billed.      Precertification and Prior Authorizations:  If your physician has recommended that you have a procedure or additional testing performed the [facility]  Centralized Referral Team will contact your insurance carrier to obtain pre-certification or prior authorization.    You are strongly encouraged to contact your insurance carrier to verify that your procedure/test has been approved and is a COVERED benefit.  Although the [facility] Centralized Referral Team does its due diligence, the insurance carrier gives the disclaimer that \"Although the procedure is authorized, this does not guarantee payment.\"    Ultimately the patient is responsible for payment.   Thank you for your understanding in this matter.  Paperwork Completion:  If you require FMLA or disability paperwork for your recovery, please make sure to either drop it off or have it faxed to our office at [phone]. Be sure the form has your name and date of birth on it.  The form will be faxed to our Forms Department and they will complete it for you.  There is a 25$ fee for all forms that need to be filled out.  Please be aware there is a 10-14 day turnaround time.  You will need to sign a release of information (ERIK) form if your paperwork does not come with one.  You may call the Forms Department with any questions at [phone].  Their fax number is [phone].

## 2024-05-10 NOTE — PROGRESS NOTES
Subjective:   Patient ID: Lokesh Joyner is a 54 year old male.    HPI    History/Other:   Review of Systems  Current Outpatient Medications   Medication Sig Dispense Refill   • methylPREDNISolone (MEDROL) 4 MG Oral Tablet Therapy Pack As directed. 21 tablet 0   • tiZANidine 2 MG Oral Tab Take 1 tablet (2 mg total) by mouth nightly. 20 tablet 0   • metFORMIN 500 MG Oral Tab Take 1 tablet (500 mg total) by mouth 2 (two) times daily with meals. 180 tablet 0   • metoprolol succinate ER 50 MG Oral Tablet 24 Hr Take 1 tablet by mouth daily. 90 tablet 0   • montelukast 10 MG Oral Tab Take 1 tablet (10 mg total) by mouth nightly. 90 tablet 1   • AMLODIPINE BESY-BENAZEPRIL HCL 5-40 MG Oral Cap Take 1 capsule by mouth daily. 90 capsule 0   • OMEPRAZOLE 20 MG Oral Capsule Delayed Release Take 1 capsule by mouth every morning. 90 capsule 0   • cyanocobalamin 1000 MCG Oral Tab Take 1 tablet (1,000 mcg total) by mouth daily. 90 tablet 0   • hydroCHLOROthiazide 12.5 MG Oral Tab Take 1 tablet by mouth once daily. 90 tablet 1   • cetirizine 10 MG Oral Tab Take 1 tablet (10 mg total) by mouth daily. 90 tablet 1   • clobetasol 0.05 % External Cream      • ibuprofen 200 MG Oral Tab Take 4 tablets (800 mg total) by mouth 2 (two) times daily as needed for Pain.     • Calcium Polycarbophil (FIBER) 625 MG Oral Tab Take 2 tablets (1,250 mg total) by mouth daily.     • Albuterol Sulfate (PROVENTIL HFA IN) Inhale 2 Inhalers into the lungs as needed.       Allergies:No Known Allergies    Objective:   Physical Exam  Constitutional:          Assessment & Plan:   No diagnosis found.    No orders of the defined types were placed in this encounter.      Meds This Visit:  Requested Prescriptions      No prescriptions requested or ordered in this encounter       Imaging & Referrals:  None    Location of Pain: low back    Date Pain Began: 04/10/24          Work Related:   No        Receiving Work Comp/Disability:   No    Numeric Rating  Scale:  Pain at Present:  0                                                                                                            (No Pain) 0  to  10 (Worst Pain)                 Minimum Pain:   1  Maximum Pain  8    Distribution of Pain:    bilateral    Quality of Pain:   aching, burning, numbness, sharp/stabbing, throbbing, and tingling    Origin of Pain:    Other unknown    Aggravating Factors:    Other NA    Past Treatments for Current Pain Condition:   Other medrol dose raina    Prior diagnostic testing for your pain:  xray and ct scan

## 2024-05-10 NOTE — PROGRESS NOTES
Patient: Lokesh Joyner  Medical Record Number: [id number]  Site: [facility]  Referring Physician:  Dr. Smyth  PCP: Dr. Pompa    Dear Dr. Smyth:    Thank you very much for requesting this consultation. I had the opportunity to evaluate and initiate care for your patient today, as per your request.    HISTORY OF CHIEF COMPLAINT:      Lokesh Joyner is a 54 year old male, who complains of n/t in L lateral toes.    Patient is here today with symptoms in above-described distribution that began as L LBP and radiating L LE pain to foot early 4/2024.  Initially, he was evaluated by PCP, and was sent for CT, which did show degenerative changes with stenosis (see below).  Sent to PT, and to spine surgery.  Encouraged to initiate PT, and was sent for LESI.  He was also given PO steroid, which has eliminated his pain, and is really only left with n/t in toes, as above.      VAS Pain Score:  0/10    Aggravating Factors: Relieving Factors:   Standing  Waking  Limiting activity      Past Treatment Attempted/Patient’s Response:  As above     Past Medical History:    Condyloma    Diabetes (HCC)    Esophageal reflux    Essential hypertension    Extrinsic asthma, unspecified    / in good control    Spinal stenosis      Past Surgical History:   Procedure Laterality Date    Injection, anesthetic/steroid, transforaminal epidural; cervical/thoracic, single level  sept 2016    x3    Other      x2 surgical procedures on mouth/ gumline and tongue    Other      laser ablation condyloma      No family history on file.   Social History     Socioeconomic History    Marital status:    Tobacco Use    Smoking status: Former    Smokeless tobacco: Never    Tobacco comments:     smoked in college   Vaping Use    Vaping status: Never Used   Substance and Sexual Activity    Alcohol use: Yes     Comment: 2-3 x week    Drug use: Yes     Types: Cannabis     Comment: medical use-has card      Current Medications:  Current  Outpatient Medications   Medication Sig Dispense Refill    metFORMIN 500 MG Oral Tab Take 1 tablet (500 mg total) by mouth 2 (two) times daily with meals. 180 tablet 0    metoprolol succinate ER 50 MG Oral Tablet 24 Hr Take 1 tablet by mouth daily. 90 tablet 0    montelukast 10 MG Oral Tab Take 1 tablet (10 mg total) by mouth nightly. 90 tablet 1    AMLODIPINE BESY-BENAZEPRIL HCL 5-40 MG Oral Cap Take 1 capsule by mouth daily. 90 capsule 0    OMEPRAZOLE 20 MG Oral Capsule Delayed Release Take 1 capsule by mouth every morning. 90 capsule 0    cyanocobalamin 1000 MCG Oral Tab Take 1 tablet (1,000 mcg total) by mouth daily. 90 tablet 0    hydroCHLOROthiazide 12.5 MG Oral Tab Take 1 tablet by mouth once daily. 90 tablet 1    cetirizine 10 MG Oral Tab Take 1 tablet (10 mg total) by mouth daily. 90 tablet 1    ibuprofen 200 MG Oral Tab Take 4 tablets (800 mg total) by mouth 2 (two) times daily as needed for Pain.      Calcium Polycarbophil (FIBER) 625 MG Oral Tab Take 2 tablets (1,250 mg total) by mouth daily.      Albuterol Sulfate (PROVENTIL HFA IN) Inhale 2 Inhalers into the lungs as needed.          Functional Assessment: Patient reports that they are able to complete all of their ADL's such as eating, bathing, using the toilet, dressing and getting up from a bed or a chair independently.    Work History:  The patient currently is in consulting ().    REVIEW OF SYSTEMS:   10 point review of systems is otherwise negative,unless otherwise in HPI.      Radiology/Lab Test Reviewed:  XR L spine 5/2/24:    FINDINGS:      Normal alignment of the lumbar spine.  No acute osseous injuries.  Moderate disc disease with disc height loss and endplate osteophytes noted at L4-5 and L5-S1.  The paraspinal soft tissues are unremarkable.     CT L spine 4/27/24:    CONCLUSION:       1. No acute fracture or subluxation in the lumbar spine.  Multilevel degenerative changes as above.  Developmental narrowing of the lumbar spinal  canal contributes to multilevel spinal canal stenosis.      2. Mild spinal canal stenosis with mild bilateral subarticular zone narrowing at L2-3.  Mild to moderate spinal canal stenosis with bilateral subarticular zone stenosis at L3-4 and L4-5.  Mild spinal canal stenosis with left subarticular zone stenosis at L5-S1.      3. Mild bilateral neural foraminal stenosis at L2-3.  Mild to moderate bilateral neural foraminal stenosis at L3-4.  Moderate bilateral neural foraminal stenosis at L4-5.  Moderate right and severe left neural foraminal stenosis at L5-S1.      4. Mild facet arthropathy throughout the lumbar spine as above.     CBC:    Lab Results   Component Value Date    WBC 5.1 02/27/2024    WBC 4.4 07/14/2022    WBC 5.0 04/20/2022   No results found for: \"HEMOGLOBIN\"  Lab Results   Component Value Date    .0 02/27/2024    .0 07/14/2022    .0 04/20/2022       PHYSICAL EXAMIMATION   PHYSICAL EXAMINATION: Lokesh Joyner is a 54 year old male who is observed sitting comfortably on a chair in the exam room alert and oriented times three. He looks consistent with his stated age.    Ht Readings from Last 1 Encounters:   04/16/24 73\"     Wt Readings from Last 1 Encounters:   04/16/24 250 lb (113.4 kg)     The patient is well developed, well nourished, normal body habitus, well muscled. He moves independently from sitting to standing with ease.       Inspection:  No acute distress    Patient displays Non-antalgic gait, and is able to Normal heel walk, Normal toe walk.    Coordination:  Well-coordinated, fluent gait, able to engage in rapid alternating movements of upper and lower extremities.    ROM Lumbar Spine:  See chart below:  Motion Right (+ or -) Left (+ or -)   Lumbar Flexion - -   Lumbar Extension - -   Lumbar Bending - -   Lumbar Extension/ twisting motion - -     Lumbar/Sacral Integument:  Skin over lumbar sacral spine is intact without rashes, excoriations, lesions or erythema  noted    Palpation:  See chart below:  Palpation of lumbar area Right (+ or -) Left (+ or -)   Lumbar facets - -   Lumbar paraspinals - -   Piriformis - -   SIJ - -   Trochanteric Bursa - -     Strength:  Strength of bilateral lower extremities grossly intact; 5/5 throughout    Sensation:  No sensory deficits noted on bilateral lower extremities to light touch    Tests:  Test Right (+ or -) Left (+ or -)   SLR - -   Buddy’s     Babinski     Clonus       HEAD/NECK: Head is normocephalic, neck supple  EYES: EOMI, JEFF  SKIN EXAM: Skin is intact, head, neck, trunk and arms/legs. No rashes, mottling or ulcerations.  LYMPH EXAM: There is no lymph edema in either lower extremity.  VASCULAR EXAM: Pedal pulses are normal bilaterally, with good distal perfusion. No clubbing or cyanosis.  ABDOMINAL EXAM: Abdomen is soft without masses palpated.  HEART: normal, regular, S1 and S2  LUNGS:CTA  MUSCULOSKELETAL: Smooth, pain-free ROM to bilat hips, ankles, and knees.     Do you have any known blood/bleeding disorders?  No  Does patient currently take blood thinners?   None  Does patient currently take any antibiotics?   No      Patient is currently on pain medications:  No  Reason pain medications are prescribed: N/A  Pain medications are prescribed by: N/A  [place] Prescription Monitoring review: N/A  DIRE: N/A  Treatment decision: N/A    MEDICAL DECISION MAKING:     Impression: Lumbar stenosis    Patient has CT evidence of mild if not moderate stenosis, with previous complaints of low back and radicular left lower extremity pain to the foot.  Symptoms have been ongoing for approximately a month, and with oral steroid, have been largely eliminated.  Still with some numb tingling in the lateral toes, though again, no longer with low back and radiating left lower extremity pain.  He had been sent to physical therapy, and was encouraged to initiate.  At this time, no injections required.    Of note, he continues with complaints  of pelvic floor and abdominal pain, for which he is awaiting workup with colorectal specialist.    Plan: Initiate physical therapy as scheduled.  If pain continues to be eliminated, we will simply see him back on an as-needed basis.  If symptoms return, contact clinic and would be happy to place order for intralaminar epidural steroid injection.    The patient indicates understanding of these issues and agrees to the plan.      Thank you very much.     Respectfully yours,    KVNG Matthews

## 2024-05-29 RX ORDER — MELATONIN
1000 DAILY
Qty: 90 TABLET | Refills: 0 | Status: SHIPPED | OUTPATIENT
Start: 2024-05-29

## 2024-05-29 RX ORDER — METOPROLOL SUCCINATE 50 MG/1
50 TABLET, EXTENDED RELEASE ORAL DAILY
Qty: 90 TABLET | Refills: 3 | Status: SHIPPED | OUTPATIENT
Start: 2024-05-29

## 2024-05-29 RX ORDER — OMEPRAZOLE 20 MG/1
CAPSULE, DELAYED RELEASE ORAL
Qty: 90 CAPSULE | Refills: 3 | Status: SHIPPED | OUTPATIENT
Start: 2024-05-29

## 2024-05-29 RX ORDER — AMLODIPINE AND BENAZEPRIL HYDROCHLORIDE 5; 40 MG/1; MG/1
1 CAPSULE ORAL DAILY
Qty: 90 CAPSULE | Refills: 3 | Status: SHIPPED | OUTPATIENT
Start: 2024-05-29

## 2024-05-29 NOTE — TELEPHONE ENCOUNTER
Please review. Protocol Failed; No Protocol    Requested Prescriptions   Pending Prescriptions Disp Refills    CYANOCOBALAMIN 1000 MCG Oral Tab [Pharmacy Med Name: VITAMIN B12 1000 MCG TAB] 90 tablet 0     Sig: Take 1 tablet by mouth daily.       There is no refill protocol information for this order      Signed Prescriptions Disp Refills    metoprolol succinate ER 50 MG Oral Tablet 24 Hr 90 tablet 3     Sig: Take 1 tablet by mouth daily.       Hypertension Medications Protocol Passed - 5/26/2024  4:24 AM        Passed - CMP or BMP in past 12 months        Passed - Last BP reading less than 140/90     BP Readings from Last 1 Encounters:   05/10/24 110/64               Passed - In person appointment or virtual visit in the past 12 mos or appointment in next 3 mos     Recent Outpatient Visits              2 weeks ago Spinal stenosis of lumbar region with neurogenic claudication    [facility], [facility] Prashant Monteiro PA    Office Visit    3 weeks ago Spinal stenosis of lumbar region with neurogenic claudication    [facility], [address] [place] [address] Joel Smyth MD    Office Visit    1 month ago Acute left-sided low back pain without sciatica    [facility], [address] [place] [address] Aaron Regalado MD    Office Visit    2 months ago Diarrhea, unspecified type    [address] Aaron Regalado MD    Office Visit    3 months ago Right upper quadrant abdominal pain    [facility], [address] [place] [address] Aaron Regalado MD    Office Visit          Future Appointments         Provider Department Appt Notes    In 2 days Joel Smyth MD [facility], [address] [place] [address] 1MOS. LUMBAR SPINAL STENOSIS RE-EVAL    In 5 days Emily Frazier MD [facility], [facility] NP- Difficulty defecating                    Passed - EGFRCR or GFRNAA > 50     GFR  Evaluation  EGFRCR: 82 , resulted on 2/27/2024            amLODIPine Besy-Benazepril HCl 5-40 MG Oral Cap 90 capsule 3     Sig: Take 1 capsule by mouth daily.       Hypertension Medications Protocol Passed - 5/26/2024  4:24 AM        Passed - CMP or BMP in past 12 months        Passed - Last BP reading less than 140/90     BP Readings from Last 1 Encounters:   05/10/24 110/64               Passed - In person appointment or virtual visit in the past 12 mos or appointment in next 3 mos     Recent Outpatient Visits              2 weeks ago Spinal stenosis of lumbar region with neurogenic claudication    [facility] Prashant Monteiro [facility]    Office Visit    3 weeks ago Spinal stenosis of lumbar region with neurogenic claudication    [facility], [address] [place] [address] Joel Smyth MD    Office Visit    1 month ago Acute left-sided low back pain without sciatica    [facility], [address] [place] [address], Aaron Regalado MD    Office Visit    2 months ago Diarrhea, unspecified type    [address] Aaron Regalado MD    Office Visit    3 months ago Right upper quadrant abdominal pain    [address] Aaron Regalado MD    Office Visit          Future Appointments         Provider Department Appt Notes    In 2 days Joel Smyth MD [facility], [address] [place] [address] 1MOS. LUMBAR SPINAL STENOSIS RE-EVAL    In 5 days Emily Frazier MD [facility], [facility] NP- Difficulty defecating                    Passed - EGFRCR or GFRNAA > 50     GFR Evaluation  EGFRCR: 82 , resulted on 2/27/2024            omeprazole 20 MG Oral Capsule Delayed Release 90 capsule 3     Sig: Take 1 capsule by mouth every morning.       Gastrointestional Medication Protocol Passed - 5/26/2024  4:24 AM        Passed - In person appointment or virtual  visit in the past 12 mos or appointment in next 3 mos     Recent Outpatient Visits              2 weeks ago Spinal stenosis of lumbar region with neurogenic claudication    [facility]    Office Visit    3 weeks ago Spinal stenosis of lumbar region with neurogenic claudication    [facility], [address] [place] [address], [place] Mendoza MD    Office Visit    1 month ago Acute left-sided low back pain without sciatica    [facility], [address] [place], [place] [address]Kell Kaleem, MD    Office Visit    2 months ago Diarrhea, unspecified type    [facility], [address] [place] [address], Aaron Regalado MD    Office Visit    3 months ago Right upper quadrant abdominal pain    [facility], [address] [place] [address], Aaron Regalado MD    Office Visit          Future Appointments         Provider Department Appt Notes    In 2 days Joel Smyth MD [facility], [address] [place] [address] 1MOS. LUMBAR SPINAL STENOSIS RE-EVAL    In 5 days Emily Frazier MD [facility], [facility] NP- Difficulty defecating                           Future Appointments         Provider Department Appt Notes    In 2 days Joel Smyth MD [facility], [address] [place] [address] 1MOS. LUMBAR SPINAL STENOSIS RE-EVAL    In 5 days Emily Frazier MD [facility], [facility] NP- Difficulty defecating          Recent Outpatient Visits              2 weeks ago Spinal stenosis of lumbar region with neurogenic claudication    [facility], [facility]    Office Visit    3 weeks ago Spinal stenosis of lumbar region with neurogenic claudication    [facility], [address] [place] [address] Joel Everett MD    Office Visit    1 month ago Acute left-sided low back pain without sciatica     [facility], [address] [place] [address], Aaron Regalado MD    Office Visit    2 months ago Diarrhea, unspecified type    [facility], [address] [place] [address], Aaron Regalado MD    Office Visit    3 months ago Right upper quadrant abdominal pain    [facility], [address] [place] [address], Araon Regalado MD    Office Visit

## 2024-05-31 ENCOUNTER — OFFICE VISIT (OUTPATIENT)
Facility: CLINIC | Age: 54
End: 2024-05-31

## 2024-05-31 VITALS — HEIGHT: 73 IN | BODY MASS INDEX: 30.88 KG/M2 | WEIGHT: 233 LBS

## 2024-05-31 DIAGNOSIS — M48.062 SPINAL STENOSIS OF LUMBAR REGION WITH NEUROGENIC CLAUDICATION: Primary | ICD-10-CM

## 2024-05-31 PROCEDURE — 99213 OFFICE O/P EST LOW 20 MIN: CPT | Performed by: STUDENT IN AN ORGANIZED HEALTH CARE EDUCATION/TRAINING PROGRAM

## 2024-05-31 NOTE — PROGRESS NOTES
[facility] - ORTHOPEDICS  [address], [address], [place] [address]  [address] [place] [id number]  [phone]     FOLLOW-UP PATIENT VISIT    Name: Lokesh Joyner   MRN: [id number]  Date: 5/31/2024     CC: lumbar stenosis      INTERVAL HISTORY:   Lokesh Joyner is a 54 year old male  follow-up patient whom I have been treating conservatively. Patient returns today for reevaluation of lumbar stenosis.     Patient was last seen in clinic approximately 1 month ago with neck and lower extremity symptoms.  Patient was prescribed oral steroids with resolution of symptoms.  Patient has also followed up with pain clinic but did not find injection therapy necessary.    Bowel and bladder symptoms: absent.    We have tried the following interventions thus far: oral medications    ROS: No fever/chills or other constitutional issues.    PE:   Vitals:    05/31/24 1149   Weight: 233 lb (105.7 kg)   Height: 6' 1\" (1.854 m)     Estimated body mass index is 30.74 kg/m² as calculated from the following:    Height as of this encounter: 6' 1\" (1.854 m).    Weight as of this encounter: 233 lb (105.7 kg).    On physical examination, he is awake, alert and oriented x 3 and in no acute distress. Mood, affect and language are normal. He appears well developed and well nourished.  He walks without a nonantalgic, nonmyelopathic, non-Trendelenburg gait. Motor strength testing of the lower extremities shows 5/5 strength in hip flexors, knee extensors, ankle dorsiflexors, toe extensor, and gastroc-soleus complex.   Sensation is intact to light touch L2-S1 distributions bilaterally. Reflexes normal.     IMPRESSION: Lokesh Joyner is a 54 year old male lumbar stenosis, responding well to medical treatment    PLAN:   -Patient currently is asymptomatic, can follow up as needed.    I spent 20 minutes in preparation to see the patient, counseling/education of relevant pathology, discussing  imaging results, ordering medication/therapy intervention, and care coordination.        Joel Smyth MD  Orthopedic Spine Surgeon  [facility]   [address] [place] [address], [email or web address]  [email or web address]  t: [phone]   f: [phone]        This note was dictated using Dragon software.  While it was briefly proofread prior to completion, some grammatical, spelling, and word choice errors due to dictation may still occur.

## 2024-06-03 ENCOUNTER — OFFICE VISIT (OUTPATIENT)
Facility: LOCATION | Age: 54
End: 2024-06-03
Payer: COMMERCIAL

## 2024-06-03 VITALS — HEART RATE: 79 BPM | TEMPERATURE: 97 F

## 2024-06-03 DIAGNOSIS — R10.2 PELVIC PAIN: Primary | ICD-10-CM

## 2024-06-03 PROCEDURE — 99203 OFFICE O/P NEW LOW 30 MIN: CPT | Performed by: STUDENT IN AN ORGANIZED HEALTH CARE EDUCATION/TRAINING PROGRAM

## 2024-06-03 NOTE — H&P
Patient ID: Lokesh Joyner is a 54 year old male.    Chief Complaint   Patient presents with    New Patient     NP- Difficulty defecating         HPI: Lokesh Joyner is a 54 year old male presents to clinic for evaluation.  Patient reports having some abdominal and pelvic pain that started in February of this year.  He denies having any constipation but feels that his bowels have been slow and hard to get out.  He is able to go daily but feels that his bowels.  In his left lower quadrant.  He then has to lay on his bed with his legs up and give himself time for his bowels to move.  After that, he is able to have normal bowel movements.  He does state that sometimes they are normal size and other times they can be like a ribbon.  He denies any other symptoms including nausea or vomiting.  In February when his symptoms first presented, he was training and try to have a bowel movement.  This made his pain excruciating.  During this time, he had trouble urinating and reports having pain with intercourse.  He began taking stool softeners and fiber as needed to help with his bowels.  In April, his pain began to get better and he began giving his body time before going to have a bowel movement.  Patient does report that when he finally feels like he has to have a bowel movement, he must go soon after, but he denies any incontinence.  When he had significant pain, pain would be down the backs of his legs.  He did undergo orthopedic workup and did not have any findings.  Today, patient reports improvement of his symptoms.  He denies any pain.  He continues to have to lift his legs for.  Before having a bowel movement.  His last colonoscopy was in November 2021 and just showed hemorrhoids.    Workup:   Last colonoscopy 11/11/2021 showed hemorrhoids      Past Medical History  Past Medical History:    Condyloma    Diabetes (HCC)    Esophageal reflux    Essential hypertension    Extrinsic asthma, unspecified    /  in good control    Spinal stenosis       Past Surgical History  Past Surgical History:   Procedure Laterality Date    Injection, anesthetic/steroid, transforaminal epidural; cervical/thoracic, single level  sept 2016    x3    Other      x2 surgical procedures on mouth/ gumline and tongue    Other      laser ablation condyloma       Medications  Current Outpatient Medications   Medication Sig Dispense Refill    metoprolol succinate ER 50 MG Oral Tablet 24 Hr Take 1 tablet by mouth daily. 90 tablet 3    amLODIPine Besy-Benazepril HCl 5-40 MG Oral Cap Take 1 capsule by mouth daily. 90 capsule 3    cyanocobalamin 1000 MCG Oral Tab Take 1 tablet (1,000 mcg total) by mouth daily. 90 tablet 0    omeprazole 20 MG Oral Capsule Delayed Release Take 1 capsule by mouth every morning. 90 capsule 3    metFORMIN 500 MG Oral Tab Take 1 tablet (500 mg total) by mouth 2 (two) times daily with meals. 180 tablet 0    montelukast 10 MG Oral Tab Take 1 tablet (10 mg total) by mouth nightly. 90 tablet 1    hydroCHLOROthiazide 12.5 MG Oral Tab Take 1 tablet by mouth once daily. 90 tablet 1    cetirizine 10 MG Oral Tab Take 1 tablet (10 mg total) by mouth daily. 90 tablet 1    ibuprofen 200 MG Oral Tab Take 4 tablets (800 mg total) by mouth 2 (two) times daily as needed for Pain.      Calcium Polycarbophil (FIBER) 625 MG Oral Tab Take 2 tablets (1,250 mg total) by mouth daily.      Albuterol Sulfate (PROVENTIL HFA IN) Inhale 2 Inhalers into the lungs as needed.         Allergies  No Known Allergies    Social History  History   Smoking Status    Former   Smokeless Tobacco    Never     History   Alcohol Use    Yes     Comment: 2-3 x week     History   Drug Use    Types: Cannabis     Comment: medical use-has card       Family History  History reviewed. No pertinent family history.    Review of Systems  Review of Systems   Constitutional: Negative.    Respiratory: Negative.     Cardiovascular: Negative.    Gastrointestinal:  Positive for  abdominal pain and rectal pain. Negative for constipation, diarrhea, nausea and vomiting.       Exam  Vitals:    06/03/24 1004   Pulse: 79   Temp: 96.8 °F (36 °C)     Physical Exam  Constitutional:       Appearance: Normal appearance.   Cardiovascular:      Rate and Rhythm: Normal rate.   Pulmonary:      Effort: Pulmonary effort is normal.   Abdominal:      General: There is no distension.      Palpations: Abdomen is soft.      Tenderness: There is abdominal tenderness (Mild tenderness to palpation in the left lower quadrant).   Genitourinary:     Comments: Visual anal exam: Very minimal external hemorrhoid tissue, no hard nodules or abnormalities  Digital rectal exam: No hard nodules palpated, no abnormalities, no blood on finger  Anoscopy: Grade 3 hemorrhoids in all position, no active bleeding, no abnormalities appreciated    Musculoskeletal:         General: Normal range of motion.   Skin:     General: Skin is warm and dry.   Neurological:      Mental Status: He is alert and oriented to person, place, and time.         Assessment/Plan  Assessment   Problem List Items Addressed This Visit    None  Visit Diagnoses       Pelvic pain    -  Primary            Lokesh Joyner is a 54 year old male with possible pelvic floor dysfunction    Based on patient's history and symptoms, differential includes pelvic floor dysfunction, slow transit constipation, and less likely, tumor  Patient reports being able to have a bowel movement but reports that it takes time for it to come  He has also had a history of pelvic pain with defecation, urinating, and intercourse  Patient is having a CT scan of the abdomen pelvis through his PCP tomorrow, I will follow-up on the read  I think CT scan to rule out any other mechanical etiologies of his symptoms  I did perform an anoscopy on exam today which was unrevealing  I do believe patient is having either slow transit or pelvic floor dysfunction  No acute surgical intervention at  this time  I will refer patient to GI for pelvic floor evaluation    Patient can contact my office for any questions or concerns  He can follow-up as needed    Thank you for let me participate in care of this patient      Emily Frazier MD  General Surgery  [facility]     CC:  Aaron Pompa MD

## 2024-06-17 PROCEDURE — 88365 INSITU HYBRIDIZATION (FISH): CPT | Performed by: PATHOLOGY

## 2024-06-17 PROCEDURE — 88342 IMHCHEM/IMCYTCHM 1ST ANTB: CPT | Performed by: PATHOLOGY

## 2024-06-21 ENCOUNTER — LAB REQUISITION (OUTPATIENT)
Age: 54
End: 2024-06-21

## 2024-06-21 DIAGNOSIS — D48.9 NEOPLASM OF UNCERTAIN BEHAVIOR: ICD-10-CM

## 2024-06-24 PROCEDURE — 88342 IMHCHEM/IMCYTCHM 1ST ANTB: CPT | Performed by: PATHOLOGY

## 2024-07-01 ENCOUNTER — LAB REQUISITION (OUTPATIENT)
Age: 54
End: 2024-07-01
Payer: COMMERCIAL

## 2024-07-01 DIAGNOSIS — D48.9 NEOPLASM OF UNCERTAIN BEHAVIOR: ICD-10-CM

## 2024-07-03 NOTE — TELEPHONE ENCOUNTER
Please review; protocol failed/ has no protocol    Requested Prescriptions   Pending Prescriptions Disp Refills    METFORMIN 500 MG Oral Tab [Pharmacy Med Name: Metformin Hydrochloride 500mg Tablet] 180 tablet 0     Sig: Take 1 tablet by mouth twice daily with meals.       Diabetes Medication Protocol Failed - 6/30/2024  4:26 AM        Failed - Microalbumin procedure in past 12 months or taking ACE/ARB        Passed - Last A1C < 7.5 and within past 6 months     Lab Results   Component Value Date    A1C 7.5 (H) 02/27/2024             Passed - In person appointment or virtual visit in the past 6 mos or appointment in next 3 mos     Recent Outpatient Visits              1 month ago Pelvic pain    [facility]    Office Visit    1 month ago Spinal stenosis of lumbar region with neurogenic claudication    [facility], [address] [place] [address] Joel Smyth MD    Office Visit    1 month ago Spinal stenosis of lumbar region with neurogenic claudication    [facility]    [facility] Visit    2 months ago Spinal stenosis of lumbar region with neurogenic claudication    [facility], [address] [place] [address] Joel Everett MD    Office Visit    2 months ago Acute left-sided low back pain without sciatica    [facility], [address] [place] [address] Aaron Regalado MD    Office Visit                      Passed - EGFRCR or GFRNAA > 50     GFR Evaluation  EGFRCR: 82 , resulted on 2/27/2024          Passed - GFR in the past 12 months           Recent Outpatient Visits              1 month ago Pelvic pain    [facility]    Office Visit    1 month ago Spinal stenosis of lumbar region with neurogenic claudication    [facility], [address] [place] [address] Joel Hogan MD    Office Visit    1  month ago Spinal stenosis of lumbar region with neurogenic claudication    [facility], [facility] Rizo [facility]    Office Visit    2 months ago Spinal stenosis of lumbar region with neurogenic claudication    [facility], [address] [place] [address] Joel Everett MD    Office Visit    2 months ago Acute left-sided low back pain without sciatica    [facility], [address] [place] [address] Aaron Regalado MD    Office Visit

## (undated) NOTE — LETTER
24    Patient: Lokesh Joyner  : 1970 Visit date: 6/3/2024    Dear  Aaron Pompa MD    Thank you for referring Lokesh Joyner to my practice.  Please find my assessment and plan below.        Good morning.  Thank you for the referral.  I saw Lokesh in my clinic yesterday for pelvic pain.  Based on his history and symptoms, I believe we may be suffering from either pelvic floor dysfunction or slow transit dysfunction of his colon.  He is supposed be getting a CT scan of the abdomen pelvis today.  I will follow-up on these results.  His last colonoscopy was in  and negative for any significant findings.  I agree with the CT scan to rule out any potential mechanical etiologies of his symptoms.  I have referred Lokesh to GI for pelvic floor and colonic dysfunction evaluation.  He will follow-up with me as needed.  Thank you once again for the referral and let me know if you need anything else.    Sincerely,       Emily Frazier MD   CC: Jerome Fitch MD